# Patient Record
Sex: MALE | Race: WHITE | NOT HISPANIC OR LATINO | Employment: FULL TIME | URBAN - METROPOLITAN AREA
[De-identification: names, ages, dates, MRNs, and addresses within clinical notes are randomized per-mention and may not be internally consistent; named-entity substitution may affect disease eponyms.]

---

## 2018-07-19 ENCOUNTER — HISTORICAL (OUTPATIENT)
Dept: ADMINISTRATIVE | Facility: HOSPITAL | Age: 43
End: 2018-07-19

## 2018-07-19 LAB
ALBUMIN SERPL-MCNC: 4.1 GM/DL (ref 3.4–5)
ALBUMIN/GLOB SERPL: 1 RATIO (ref 1–2)
ALP SERPL-CCNC: 70 UNIT/L (ref 45–117)
ALT SERPL-CCNC: 36 UNIT/L (ref 12–78)
APPEARANCE, UA: CLEAR
AST SERPL-CCNC: 22 UNIT/L (ref 15–37)
BACTERIA #/AREA URNS AUTO: ABNORMAL /[HPF]
BILIRUB SERPL-MCNC: 1.5 MG/DL (ref 0.2–1)
BILIRUB UR QL STRIP: NEGATIVE
BILIRUBIN DIRECT+TOT PNL SERPL-MCNC: 0.3 MG/DL
BILIRUBIN DIRECT+TOT PNL SERPL-MCNC: 1.2 MG/DL
BUN SERPL-MCNC: 10 MG/DL (ref 7–18)
CALCIUM SERPL-MCNC: 9.2 MG/DL (ref 8.5–10.1)
CHLORIDE SERPL-SCNC: 107 MMOL/L (ref 98–107)
CHOLEST SERPL-MCNC: 214 MG/DL
CHOLEST/HDLC SERPL: 5.9 {RATIO} (ref 0–5)
CO2 SERPL-SCNC: 30 MMOL/L (ref 21–32)
COLOR UR: YELLOW
CREAT SERPL-MCNC: 1.1 MG/DL (ref 0.6–1.3)
EST. AVERAGE GLUCOSE BLD GHB EST-MCNC: 108 MG/DL
GLOBULIN SER-MCNC: 3.7 GM/ML (ref 2.3–3.5)
GLUCOSE (UA): NORMAL
GLUCOSE SERPL-MCNC: 88 MG/DL (ref 74–106)
HBA1C MFR BLD: 5.4 % (ref 4.2–6.3)
HDLC SERPL-MCNC: 36 MG/DL
HGB UR QL STRIP: NEGATIVE
HYALINE CASTS #/AREA URNS LPF: ABNORMAL /[LPF]
KETONES UR QL STRIP: NEGATIVE
LDLC SERPL CALC-MCNC: 140 MG/DL (ref 0–130)
LEUKOCYTE ESTERASE UR QL STRIP: NEGATIVE
NITRITE UR QL STRIP: NEGATIVE
PH UR STRIP: 5.5 [PH] (ref 4.5–8)
POTASSIUM SERPL-SCNC: 5 MMOL/L (ref 3.5–5.1)
PROT SERPL-MCNC: 7.8 GM/DL (ref 6.4–8.2)
PROT UR QL STRIP: NEGATIVE
RBC #/AREA URNS AUTO: ABNORMAL /[HPF]
SODIUM SERPL-SCNC: 141 MMOL/L (ref 136–145)
SP GR UR STRIP: 1.02 (ref 1–1.03)
SQUAMOUS #/AREA URNS LPF: ABNORMAL /[LPF]
T4 FREE SERPL-MCNC: 1.07 NG/DL (ref 0.76–1.46)
TRIGL SERPL-MCNC: 190 MG/DL
TSH SERPL-ACNC: 2.64 MIU/L (ref 0.36–3.74)
UROBILINOGEN UR STRIP-ACNC: NORMAL
VLDLC SERPL CALC-MCNC: 38 MG/DL
WBC #/AREA URNS AUTO: ABNORMAL /HPF

## 2018-08-21 ENCOUNTER — HISTORICAL (OUTPATIENT)
Dept: RADIOLOGY | Facility: HOSPITAL | Age: 43
End: 2018-08-21

## 2018-08-27 ENCOUNTER — HISTORICAL (OUTPATIENT)
Dept: RADIOLOGY | Facility: HOSPITAL | Age: 43
End: 2018-08-27

## 2018-09-07 ENCOUNTER — HISTORICAL (OUTPATIENT)
Dept: RADIOLOGY | Facility: HOSPITAL | Age: 43
End: 2018-09-07

## 2018-10-16 ENCOUNTER — HOSPITAL ENCOUNTER (OUTPATIENT)
Dept: RADIOLOGY | Facility: HOSPITAL | Age: 43
Discharge: HOME | End: 2018-10-16
Attending: PHYSICIAN ASSISTANT

## 2018-10-16 ENCOUNTER — OFFICE VISIT (OUTPATIENT)
Dept: ORTHOPEDICS | Facility: CLINIC | Age: 43
End: 2018-10-16

## 2018-10-16 VITALS
WEIGHT: 269.19 LBS | DIASTOLIC BLOOD PRESSURE: 97 MMHG | HEIGHT: 72 IN | HEART RATE: 93 BPM | BODY MASS INDEX: 36.46 KG/M2 | SYSTOLIC BLOOD PRESSURE: 156 MMHG

## 2018-10-16 DIAGNOSIS — M75.102 TEAR OF LEFT ROTATOR CUFF, UNSPECIFIED TEAR EXTENT: ICD-10-CM

## 2018-10-16 DIAGNOSIS — M25.512 LEFT SHOULDER PAIN, UNSPECIFIED CHRONICITY: ICD-10-CM

## 2018-10-16 DIAGNOSIS — M25.512 LEFT SHOULDER PAIN, UNSPECIFIED CHRONICITY: Primary | ICD-10-CM

## 2018-10-16 PROCEDURE — 99204 OFFICE O/P NEW MOD 45 MIN: CPT | Mod: PBBFAC,25 | Performed by: PHYSICIAN ASSISTANT

## 2018-10-16 PROCEDURE — 99203 OFFICE O/P NEW LOW 30 MIN: CPT | Mod: S$PBB,,, | Performed by: PHYSICIAN ASSISTANT

## 2018-10-16 PROCEDURE — 73030 X-RAY EXAM OF SHOULDER: CPT | Mod: TC,LT

## 2018-10-16 PROCEDURE — 73030 X-RAY EXAM OF SHOULDER: CPT | Mod: 26,LT,, | Performed by: RADIOLOGY

## 2018-10-16 PROCEDURE — 99999 PR PBB SHADOW E&M-NEW PATIENT-LVL IV: CPT | Mod: PBBFAC,,, | Performed by: PHYSICIAN ASSISTANT

## 2018-10-16 RX ORDER — CLONIDINE HYDROCHLORIDE 0.1 MG/1
0.1 TABLET ORAL DAILY PRN
COMMUNITY

## 2018-10-16 RX ORDER — ROSUVASTATIN CALCIUM 20 MG/1
20 TABLET, COATED ORAL DAILY
COMMUNITY

## 2018-10-16 RX ORDER — FLUCONAZOLE 50 MG/1
TABLET ORAL DAILY
COMMUNITY
End: 2018-11-08 | Stop reason: CLARIF

## 2018-10-16 RX ORDER — TRAMADOL HYDROCHLORIDE 50 MG/1
50 TABLET ORAL EVERY 6 HOURS PRN
COMMUNITY
End: 2019-03-11

## 2018-10-16 RX ORDER — ACETAMINOPHEN AND CODEINE PHOSPHATE 300; 30 MG/1; MG/1
TABLET ORAL
COMMUNITY
End: 2019-10-24

## 2018-10-16 RX ORDER — HYDROCHLOROTHIAZIDE 25 MG/1
50 TABLET ORAL DAILY
COMMUNITY

## 2018-10-16 RX ORDER — CLINDAMYCIN HYDROCHLORIDE 150 MG/1
150 CAPSULE ORAL EVERY 6 HOURS
COMMUNITY
End: 2018-11-08 | Stop reason: CLARIF

## 2018-10-16 RX ORDER — HYDRALAZINE HYDROCHLORIDE 50 MG/1
50 TABLET, FILM COATED ORAL 2 TIMES DAILY
COMMUNITY

## 2018-10-16 RX ORDER — IBUPROFEN 800 MG/1
800 TABLET ORAL 3 TIMES DAILY
COMMUNITY
End: 2019-10-24

## 2018-10-16 RX ORDER — MELOXICAM 15 MG/1
15 TABLET ORAL DAILY
COMMUNITY
End: 2018-11-08 | Stop reason: CLARIF

## 2018-10-16 RX ORDER — METRONIDAZOLE 500 MG/1
500 TABLET ORAL
COMMUNITY
End: 2018-11-08 | Stop reason: CLARIF

## 2018-10-16 RX ORDER — LISINOPRIL 20 MG/1
40 TABLET ORAL DAILY
COMMUNITY

## 2018-10-16 RX ORDER — DICLOFENAC POTASSIUM 50 MG/1
POWDER, FOR SOLUTION ORAL DAILY
COMMUNITY
End: 2019-10-24

## 2018-10-16 RX ORDER — CARVEDILOL 25 MG/1
25 TABLET ORAL 2 TIMES DAILY WITH MEALS
COMMUNITY
End: 2018-11-08 | Stop reason: CLARIF

## 2018-10-16 RX ORDER — CEPHALEXIN 500 MG/1
500 CAPSULE ORAL EVERY 6 HOURS
COMMUNITY
End: 2018-11-08 | Stop reason: CLARIF

## 2018-10-16 NOTE — PROGRESS NOTES
"Subjective:      Patient ID: Juan Bennett is a 43 y.o. male.    Chief Complaint: No chief complaint on file.    HPI  43 year old male presents with chief complaint of left shoulder pain since June 2018. He is LHD and does not recall trauma. He possibly had a stroke in June. Pain is worse with everything including moving it and getting dressed. He is not sleeping at night due to shoulder pain. He has taken several nsaids, muscle relaxer, and pain medicine with no relief. He went to [facility] and was given a shoulder injection 1 month ago that did not help. He was told that he is not a surgical candidate because "he can't move his arm." He cannot attend PT due to financial reasons. MRI was done last month which showed supraspinatus tear, OA, and tendinitis.   Review of Systems   Constitution: Negative for chills, fever and night sweats.   Cardiovascular: Negative for chest pain.   Respiratory: Negative for cough and shortness of breath.    Hematologic/Lymphatic: Does not bruise/bleed easily.   Skin: Negative for color change.   Gastrointestinal: Negative for heartburn.   Genitourinary: Negative for dysuria.   Neurological: Negative for numbness and paresthesias.   Psychiatric/Behavioral: Negative for altered mental status.   Allergic/Immunologic: Negative for persistent infections.         Objective:            General    Vitals reviewed.  Constitutional: He is oriented to person, place, and time. He appears well-developed and well-nourished.   Cardiovascular: Normal rate.    Neurological: He is alert and oriented to person, place, and time.             Left Shoulder Exam     Range of Motion   Active abduction:  100 abnormal   Forward Flexion:  120 abnormal   External Rotation 0 degrees: abnormal   Internal rotation 0 degrees: abnormal     Tests & Signs   Negrete test: positive  Impingement: positive  Speed's Test: positive    Other   Sensation: normal     Comments:  Positive empty can test.       Muscle " Strength   Left Upper Extremity  Supraspinatus: 3/5/5   Biceps: 2/5/5     Vascular Exam       Left Pulses      Radial:                    2+          X-ray: ordered and reviewed by myself. Bones: No fracture.  No lytic or blastic lesion.  Joints: No evidence for glenohumeral dislocation.  Acromioclavicular joint is unremarkable.  Soft tissues: Unremarkable        Assessment:       Encounter Diagnoses   Name Primary?    Left shoulder pain, unspecified chronicity Yes    Tear of left rotator cuff, unspecified tear extent           Plan:       Discussed treatment options with patient. He is interested in surgical intervention. Will get with management regarding an appt with a shoulder surgeon as we are unable to schedule something for him at this time.

## 2018-10-17 ENCOUNTER — TELEPHONE (OUTPATIENT)
Dept: ORTHOPEDICS | Facility: CLINIC | Age: 43
End: 2018-10-17

## 2018-10-17 NOTE — TELEPHONE ENCOUNTER
Spoke with patient regarding his appointment at sports medicine on 10-23-18 with Dr. Shaji Galloway

## 2018-10-17 NOTE — TELEPHONE ENCOUNTER
----- Message from Petey Washburn sent at 10/17/2018  3:26 PM CDT -----  Contact: self   Pt is requesting a call back regarding appt for left shoulder pain. Pt can be reached at [phone].

## 2018-10-23 ENCOUNTER — OFFICE VISIT (OUTPATIENT)
Dept: SPORTS MEDICINE | Facility: CLINIC | Age: 43
End: 2018-10-23

## 2018-10-23 VITALS — WEIGHT: 269.19 LBS | HEIGHT: 72 IN | BODY MASS INDEX: 36.46 KG/M2

## 2018-10-23 DIAGNOSIS — M25.512 CHRONIC LEFT SHOULDER PAIN: Primary | ICD-10-CM

## 2018-10-23 DIAGNOSIS — M19.019 DJD OF AC (ACROMIOCLAVICULAR) JOINT: ICD-10-CM

## 2018-10-23 DIAGNOSIS — G89.29 CHRONIC LEFT SHOULDER PAIN: Primary | ICD-10-CM

## 2018-10-23 DIAGNOSIS — M75.20 BICEPS TENDINITIS: ICD-10-CM

## 2018-10-23 DIAGNOSIS — M75.100 ROTATOR CUFF TEAR: ICD-10-CM

## 2018-10-23 PROCEDURE — 99212 OFFICE O/P EST SF 10 MIN: CPT | Mod: PBBFAC,PO | Performed by: ORTHOPAEDIC SURGERY

## 2018-10-23 PROCEDURE — 99999 PR PBB SHADOW E&M-EST. PATIENT-LVL II: CPT | Mod: PBBFAC,,, | Performed by: ORTHOPAEDIC SURGERY

## 2018-10-23 PROCEDURE — 99203 OFFICE O/P NEW LOW 30 MIN: CPT | Mod: S$PBB,,, | Performed by: ORTHOPAEDIC SURGERY

## 2018-10-23 NOTE — LETTER
October 25, 2018      Jayna Santillan PA-C  [address]  [place] [address]           [facility]  [address]  [place] [address]  Phone: [phone]          Patient: Juan Bennett   MR Number: [id number]   YOB: 1975   Date of Visit: 10/23/2018       Dear Jayna Santillan:    Thank you for referring Juan Bennett to me for evaluation. Attached you will find relevant portions of my assessment and plan of care.    If you have questions, please do not hesitate to call me. I look forward to following Juan Bennett along with you.    Sincerely,    Shaji Galloway MD    Enclosure  CC:  No Recipients    If you would like to receive this communication electronically, please contact [email or web address] or [phone] to request more information on [employer] access.    For providers and/or their staff who would like to refer a patient to [facility], please contact us through our one-stop-shop provider referral line, [facility], at [phone].    If you feel you have received this communication in error or would no longer like to receive these types of communications, please e-mail [email or web address]

## 2018-10-23 NOTE — LETTER
[place] [address]  [facility] [address]  Phone: [phone] October 23, 2018     Patient: Juan Bennett   YOB: 1975   Date of Visit: 10/23/2018       To Whom It May Concern:    Juan Bennett is a patient of Dr.Scott Galloway.  Please excuse him from missed work today while he attended a doctor's appointment.    If you have any questions or concerns, please don't hesitate to contact my office.    Sincerely,    Shaji Galloway MD

## 2018-10-23 NOTE — PROGRESS NOTES
CC: LEFT shoulder pain     43 y.o. Male with a history of left shoulder pain without DMITRY since June 2018.  Previously tried and failed: injections, sling, ibuprofen, mobic, voltaren tablets, tylenol 4.    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    He reports that the pain and weakness is worse with overhead activity. It also bothers him at night.    Is affecting ADLs.  Pain is 10/10 at it's worst.      History reviewed. No pertinent past medical history.    History reviewed. No pertinent surgical history.    History reviewed. No pertinent family history.      Current Outpatient Medications:     acetaminophen-codeine 300-30mg (TYLENOL #3) 300-30 mg Tab, Take by mouth., Disp: , Rfl:     AMLODIPINE BESYLATE, BULK, MISC, 10 mg by Misc.(Non-Drug; Combo Route) route., Disp: , Rfl:     carvedilol (COREG) 25 MG tablet, Take 25 mg by mouth 2 (two) times daily with meals., Disp: , Rfl:     diclofenac potassium (CAMBIA) 50 mg PwPk, Take by mouth., Disp: , Rfl:     hydrALAZINE (APRESOLINE) 50 MG tablet, Take 50 mg by mouth 3 (three) times daily., Disp: , Rfl:     hydroCHLOROthiazide (HYDRODIURIL) 25 MG tablet, Take 25 mg by mouth once daily., Disp: , Rfl:     lisinopril (PRINIVIL,ZESTRIL) 20 MG tablet, Take 20 mg by mouth once daily., Disp: , Rfl:     rosuvastatin (CRESTOR) 20 MG tablet, Take 20 mg by mouth once daily., Disp: , Rfl:     aspirin (ASPIR-81 ORAL), Take by mouth., Disp: , Rfl:     cephALEXin (KEFLEX) 500 MG capsule, Take 500 mg by mouth every 6 (six) hours., Disp: , Rfl:     clindamycin (CLEOCIN) 150 MG capsule, Take 150 mg by mouth every 6 (six) hours., Disp: , Rfl:     cloNIDine (CATAPRES) 0.1 MG tablet, Take 0.1 mg by mouth 2 (two) times daily., Disp: , Rfl:     FAMOTIDINE ORAL, 20 mg., Disp: , Rfl:     fluconazole (DIFLUCAN) 50 MG Tab, Take by mouth once daily., Disp: , Rfl:     ibuprofen (ADVIL,MOTRIN) 800 MG tablet, Take 800 mg by mouth 3 (three) times daily., Disp: , Rfl:      meloxicam (MOBIC) 15 MG tablet, Take 15 mg by mouth once daily., Disp: , Rfl:     metroNIDAZOLE (FLAGYL) 500 MG tablet, Take 500 mg by mouth every 8 (eight) hours., Disp: , Rfl:     traMADol (ULTRAM) 50 mg tablet, Take 50 mg by mouth every 6 (six) hours as needed for Pain., Disp: , Rfl:     Review of patient's allergies indicates:   Allergen Reactions    Albuterol Hives    Morphine Hives    Pcn [penicillins] Hives    Xopenex [levalbuterol hcl] Hives          REVIEW OF SYSTEMS:  Constitution: Negative. Negative for chills, fever and night sweats.   HENT: Negative for congestion and headaches.    Eyes: Negative for blurred vision, left vision loss and right vision loss.   Cardiovascular: Negative for chest pain and syncope.   Respiratory: Negative for cough and shortness of breath.    Endocrine: Negative for polydipsia, polyphagia and polyuria.   Hematologic/Lymphatic: Negative for bleeding problem. Does not bruise/bleed easily.   Skin: Negative for dry skin, itching and rash.   Musculoskeletal: Negative for falls.  Positive for left shoulder pain and muscle weakness.   Gastrointestinal: Negative for abdominal pain and bowel incontinence.   Genitourinary: Negative for bladder incontinence and nocturia.   Neurological: Negative for disturbances in coordination, loss of balance and seizures.   Psychiatric/Behavioral: Negative for depression. The patient does not have insomnia.    Allergic/Immunologic: Negative for hives and persistent infections.      PHYSICAL EXAMINATION:  Vitals:  Ht 6' (1.829 m)   Wt 122.1 kg (269 lb 2.9 oz)   BMI 36.51 kg/m²    General: The patient is alert and oriented x 3.  Mood is pleasant.  Observation of ears, eyes and nose reveal no gross abnormalities.  No labored breathing observed.  Gait is coordinated. Patient can toe walk and heel walk without difficulty.      LEFT Shoulder / Upper Extremity Exam    OBSERVATION:     Swelling  none  Deformity  none   Discoloration  none   Scapular  winging none   Scars   none  Atrophy  none    TENDERNESS / CREPITUS (T/C):          T/C      T/C   Clavicle   -/-  SUPRAspinatus    -/-     AC Jt.    -/-  INFRAspinatus  -/-    SC Jt.    -/-  Deltoid    -/-      G. Tuberosity  -/-  LH BICEP groove  -/-   Acromion:  -/-  Midline Neck   -/-     Scapular Spine -/-  Trapezium   -/-   SMA Scapula  -/-  GH jt. line - post  -/-     Scapulothoracic  -/-         ROM: (* = with pain)  Right shoulder   Left shoulder        AROM (PROM)   AROM (PROM)   FE    170° (175°)     65° (120°)  *   ER at 90° ABD  90°  (90°)    60°  (75°) *   IR (spine level)   T10     BP *    STRENGTH: (* = with pain) Right shoulder   Left shoulder    SCAPTION   5/5    3+/5    IR    5/5    4/5   ER    5/5    4/5   BICEPS   5/5    4+/5   Deltoid    5/5    5/5     SIGNS:  Painful side       NEER   +    ORANDYS  neg    REYNA   +    SPEEDS  neg     DROP ARM   +   BELLY PRESS neg   Superior escape none    LIFT-OFF  neg   X-Body ADD    neg    MOVING VALGUS neg        STABILITY TESTING    Right shoulder   Left shoulder    Translation     Anterior  up face     up face    Posterior  up face    up face    Sulcus   < 10mm    < 10 mm     Signs   Apprehension   neg      neg       Relocation   no change     no change      Jerk test  neg     neg    EXTREMITY NEURO-VASCULAR EXAM:    Sensation grossly intact to light touch all dermatomal regions.    DTR 2+ Biceps, Triceps, BR and Negative Josiass sign   Grossly intact motor function at Elbow, Wrist and Hand   Distal pulses radial and ulnar 2+, brisk cap refill, symmetric.      NECK:  Painless FROM and spinous processes non-tender. Negative Spurlings sign.      OTHER FINDINGS:  + scapular dyskinesia    XRAYS (10/16/18):  Bones: No fracture.  No lytic or blastic lesion.    Joints: No evidence for glenohumeral dislocation.  Acromioclavicular joint is unremarkable.    Soft tissues: Unremarkable.    MRI External - [facility] in [place], [place]  (9/7/18):  1. Full-thickness tear of the distal anterior to central supraspinatus tendon is seen.  Partial thickness bursal surface tearing of the remainder of the supraspinatus tendon with interstitial tearing and tendinosis is seen.  2. Mild infraspinatus tendinosis is seen.  Partial thickness articular and bursal surface tearing of the conjoint tendon is seen.  3. Split tearing of the proximal extra-articular long head biceps tendon is seen.  4. Moderate AC joint arthritic changes are seen.      ASSESSMENT:   Left shoulder pain, rotator cuff tear, AC DJD, biceps tenodesis    PLAN:    1. Treatment options were discussed with the patient about shoulder.  I reviewed the MRI images with his and what this means for his shoulder.    We discussed both non-operative and operative options for his shoulder and the risks and benefits of each. Time was given for questions to be asked and all concerns were answered.    He would like to go forward with surgery for his shoulder which I think is reasonable.  All specific risks and benefits were reviewed.    These risks include but are not limited to: bleeding, infection, scarring, re-tear of repair, irreparability of the tear, damage to neurovascular structures, damage to cartilage, stiffness, blood clots, pulmonary embolism, swelling, compartment syndrome, need for further surgery, and the risks of anesthesia.      He verbalized her understanding of these risks and wished to proceed with surgery.    Time was spent face-to-face with the patient during this encounter on counseling about treatment options including surgery and coordination of his care for preoperative visits, surgery and post-operative rehab.     The operative plan will be:    left   1. Arthroscopic rotator cuff repair with rotation medical  2. Arthroscopic subacromial decompression  3. Arthroscopic distal clavicle excision  4. Possible open biceps subpectoral tenodesis    Patient will  need medical clearance  prior to the pre-operative appointment.    All questions were answered, patient will contact us for questions or concerns in the interim.

## 2018-10-24 DIAGNOSIS — M75.22 BICEPS TENDINITIS OF LEFT UPPER EXTREMITY: ICD-10-CM

## 2018-10-24 DIAGNOSIS — M19.012 ARTHRITIS OF LEFT ACROMIOCLAVICULAR JOINT: ICD-10-CM

## 2018-10-24 DIAGNOSIS — M75.102 NONTRAUMATIC TEAR OF LEFT ROTATOR CUFF: Primary | ICD-10-CM

## 2018-10-25 ENCOUNTER — TELEPHONE (OUTPATIENT)
Dept: SPORTS MEDICINE | Facility: CLINIC | Age: 43
End: 2018-10-25

## 2018-10-25 DIAGNOSIS — M25.512 LEFT SHOULDER PAIN: Primary | ICD-10-CM

## 2018-10-25 DIAGNOSIS — M75.100 ROTATOR CUFF TEAR: ICD-10-CM

## 2018-10-25 NOTE — TELEPHONE ENCOUNTER
Left shoulder 11.15    ----- Message from Candida Lundy MA sent at 10/24/2018  9:55 AM CDT -----  Patient will do PT at     [facility] [address], [address]   PH: [phone]   Fx: [phone]     Pre op - 11/08/18      Date of surgery - 11/12/18

## 2018-10-26 NOTE — TELEPHONE ENCOUNTER
Patient is going to email me his [place] paperwork.  Instructed him that I cannot email it back to him once it is completed.  But we can fax it to him.  He will include the fax number with the email.  Says he needs it back by 11/2/18.

## 2018-10-26 NOTE — TELEPHONE ENCOUNTER
----- Message from Darell Stratton sent at 10/26/2018  2:27 PM CDT -----  Contact: patient  Attn Graciela  Please call pt at [phone].  Patient stated that the [place] paperwork has to be returned to his employer no later than 11/03/18. What is the status?    Thank you

## 2018-10-30 ENCOUNTER — TELEPHONE (OUTPATIENT)
Dept: SPORTS MEDICINE | Facility: CLINIC | Age: 43
End: 2018-10-30

## 2018-11-07 ENCOUNTER — TELEPHONE (OUTPATIENT)
Dept: SPORTS MEDICINE | Facility: CLINIC | Age: 43
End: 2018-11-07

## 2018-11-07 NOTE — TELEPHONE ENCOUNTER
----- Message from Gisel Oliveros sent at 11/7/2018  9:46 AM CST -----  Contact: Self  Calling to confirm if some document were sent over to Dr. Galloway from the pt's PCP. Pt could be reached at [phone].

## 2018-11-08 ENCOUNTER — OFFICE VISIT (OUTPATIENT)
Dept: SPORTS MEDICINE | Facility: CLINIC | Age: 43
End: 2018-11-08

## 2018-11-08 ENCOUNTER — HOSPITAL ENCOUNTER (OUTPATIENT)
Dept: PREADMISSION TESTING | Facility: OTHER | Age: 43
Discharge: HOME | End: 2018-11-08
Attending: ORTHOPAEDIC SURGERY

## 2018-11-08 ENCOUNTER — ANESTHESIA EVENT (OUTPATIENT)
Dept: SURGERY | Facility: OTHER | Age: 43
End: 2018-11-08

## 2018-11-08 VITALS
HEIGHT: 72 IN | WEIGHT: 269 LBS | SYSTOLIC BLOOD PRESSURE: 128 MMHG | BODY MASS INDEX: 36.44 KG/M2 | DIASTOLIC BLOOD PRESSURE: 64 MMHG

## 2018-11-08 VITALS
OXYGEN SATURATION: 98 % | HEART RATE: 76 BPM | DIASTOLIC BLOOD PRESSURE: 83 MMHG | SYSTOLIC BLOOD PRESSURE: 128 MMHG | HEIGHT: 72 IN | BODY MASS INDEX: 36.44 KG/M2 | TEMPERATURE: 98 F | WEIGHT: 269 LBS

## 2018-11-08 DIAGNOSIS — M75.102 NONTRAUMATIC TEAR OF LEFT ROTATOR CUFF: Primary | ICD-10-CM

## 2018-11-08 DIAGNOSIS — M19.012 ARTHRITIS OF LEFT ACROMIOCLAVICULAR JOINT: ICD-10-CM

## 2018-11-08 DIAGNOSIS — M75.22 BICEPS TENDINITIS OF LEFT UPPER EXTREMITY: ICD-10-CM

## 2018-11-08 PROCEDURE — 99999 PR PBB SHADOW E&M-EST. PATIENT-LVL IV: CPT | Mod: PBBFAC,,, | Performed by: PHYSICIAN ASSISTANT

## 2018-11-08 PROCEDURE — 99499 UNLISTED E&M SERVICE: CPT | Mod: S$PBB,,, | Performed by: PHYSICIAN ASSISTANT

## 2018-11-08 PROCEDURE — 99214 OFFICE O/P EST MOD 30 MIN: CPT | Mod: PBBFAC,PO | Performed by: PHYSICIAN ASSISTANT

## 2018-11-08 RX ORDER — TRAMADOL HYDROCHLORIDE 50 MG/1
50 TABLET ORAL EVERY 6 HOURS PRN
Qty: 40 TABLET | Refills: 0 | Status: SHIPPED | OUTPATIENT
Start: 2018-11-08 | End: 2019-02-07 | Stop reason: SDUPTHER

## 2018-11-08 RX ORDER — LIDOCAINE HYDROCHLORIDE 10 MG/ML
0.5 INJECTION, SOLUTION EPIDURAL; INFILTRATION; INTRACAUDAL; PERINEURAL ONCE
Status: CANCELLED | OUTPATIENT
Start: 2018-11-08 | End: 2018-11-08

## 2018-11-08 RX ORDER — MUPIROCIN 20 MG/G
OINTMENT TOPICAL
Status: CANCELLED | OUTPATIENT
Start: 2018-11-08

## 2018-11-08 RX ORDER — SODIUM CHLORIDE, SODIUM LACTATE, POTASSIUM CHLORIDE, CALCIUM CHLORIDE 600; 310; 30; 20 MG/100ML; MG/100ML; MG/100ML; MG/100ML
INJECTION, SOLUTION INTRAVENOUS CONTINUOUS
Status: CANCELLED | OUTPATIENT
Start: 2018-11-08

## 2018-11-08 RX ORDER — OXYCODONE AND ACETAMINOPHEN 10; 325 MG/1; MG/1
1 TABLET ORAL EVERY 6 HOURS PRN
Qty: 28 TABLET | Refills: 0 | Status: SHIPPED | OUTPATIENT
Start: 2018-11-08 | End: 2018-11-19 | Stop reason: DRUGHIGH

## 2018-11-08 RX ORDER — PROMETHAZINE HYDROCHLORIDE 25 MG/1
25 TABLET ORAL EVERY 6 HOURS PRN
Qty: 40 TABLET | Refills: 0 | Status: SHIPPED | OUTPATIENT
Start: 2018-11-08 | End: 2018-12-10 | Stop reason: SDUPTHER

## 2018-11-08 RX ORDER — ASPIRIN 325 MG
325 TABLET, DELAYED RELEASE (ENTERIC COATED) ORAL 2 TIMES DAILY
Qty: 42 TABLET | Refills: 0 | Status: SHIPPED | OUTPATIENT
Start: 2018-11-08 | End: 2018-12-03

## 2018-11-08 NOTE — DISCHARGE INSTRUCTIONS
PRE-ADMIT TESTING -  [phone]    [address]  [place] [facility]          Your surgery has been scheduled at [facility]. We are pleased to have the opportunity to serve you. For Further Information please call [phone].    On the day of surgery please report to the Information Desk on the 1st floor.    · CONTACT YOUR PHYSICIAN'S OFFICE THE DAY PRIOR TO YOUR SURGERY TO OBTAIN YOUR ARRIVAL TIME.     · The evening before surgery do not eat anything after 9 p.m. ( this includes hard candy, chewing gum and mints).  You may only have GATORADE, POWERADE AND WATER  from 9 p.m. until you leave your home.   DO NOT DRINK ANY LIQUIDS ON THE WAY TO THE HOSPITAL.      SPECIAL MEDICATION INSTRUCTIONS: TAKE medications checked off by the Anesthesiologist on your Medication List.    Angiogram Patients: Take medications as instructed by your physician, including aspirin.     Surgery Patients:    If you take ASPIRIN - Your PHYSICIAN/SURGEON will need to inform you IF/OR when you need to stop taking aspirin prior to your surgery.     Do Not take any medications containing IBUPROFEN.  Do Not Wear any make-up or dark nail polish   (especially eye make-up) to surgery. If you come to surgery with makeup on you will be required to remove the makeup or nail polish.    Do not shave your surgical area at least 5 days prior to your surgery. The surgical prep will be performed at the hospital according to Infection Control regulations.    Leave all valuables at home.   Do Not wear any jewelry or watches, including any metal in body piercings.  Contact Lens must be removed before surgery. Either do not wear the contact lens or bring a case and solution for storage.  Please bring a container for eyeglasses or dentures as required.  Bring any paperwork your physician has provided, such as consent forms,  history and physicals, doctor's orders, etc.   Bring comfortable clothes that are loose fitting to wear upon  discharge. Take into consideration the type of surgery being performed.  Maintain your diet as advised per your physician the day prior to surgery.      Adequate rest the night before surgery is advised.   Park in the Parking lot behind the hospital or in the [facility] across the street from the parking lot. Parking is complimentary.  If you will be discharged the same day as your procedure, please arrange for a responsible adult to drive you home or to accompany you if traveling by taxi.   YOU WILL NOT BE PERMITTED TO DRIVE OR TO LEAVE THE HOSPITAL ALONE AFTER SURGERY.   It is strongly recommended that you arrange for someone to remain with you for the first 24 hrs following your surgery.       Thank you for your cooperation.  The Staff of [facility].        Bathing Instructions                                                                 Please shower the evening before and morning of your procedure with    ANTIBACTERIAL SOAP. ( DIAL, etc )  Concentrate on the surgical area   for at least 3 minutes and rinse completely. Dry off as usual.   Do not use any deodorant, powder, body lotions, perfume, after shave or    cologne.

## 2018-11-08 NOTE — ANESTHESIA PREPROCEDURE EVALUATION
11/08/2018  Juan Bennett is a 43 y.o., male.    Anesthesia Evaluation    I have reviewed the Patient Summary Reports.    I have reviewed the Nursing Notes.   I have reviewed the Medications.     Review of Systems  Anesthesia Hx:  Denies Family Hx of Anesthesia complications.   Denies Personal Hx of Anesthesia complications.   Social:  Smoker    Hematology/Oncology:     Oncology Normal     Cardiovascular:   Exercise tolerance: poor Hypertension hyperlipidemia    Pulmonary:  Pulmonary Normal    Renal/:  Renal/ Normal     Hepatic/GI:  Hepatic/GI Normal    Neurological:   CVA (L sided symptoms), residual symptoms    Endocrine:  Endocrine Normal        Physical Exam  General:  Obesity, Large Beard    Airway/Jaw/Neck:  Airway Findings: Mouth Opening: Normal Tongue: Normal  General Airway Assessment: Adult, Possible difficult intubation  Mallampati: III  TM Distance: Normal, at least 6 cm      Dental:  Dental Findings: In tact        Mental Status:  Mental Status Findings:  Cooperative, Alert and Oriented         Anesthesia Plan  Type of Anesthesia, risks & benefits discussed:  Anesthesia Type:  general  Patient's Preference:   Intra-op Monitoring Plan: standard ASA monitors  Intra-op Monitoring Plan Comments:   Post Op Pain Control Plan: peripheral nerve block and per primary service following discharge from PACU  Post Op Pain Control Plan Comments:   Induction:   IV  Beta Blocker:         Informed Consent: Patient understands risks and agrees with Anesthesia plan.  Questions answered. Anesthesia consent signed with patient.  ASA Score: 3     Day of Surgery Review of History & Physical:    H&P update referred to the surgeon.     Anesthesia Plan Notes: Labs, pre-op eval, ekg on paper chart        Ready For Surgery From Anesthesia Perspective.

## 2018-11-08 NOTE — H&P
"CC:  left shoulder pain     HPI:    PLAN OF ACTION: Juan Bennett  is here for a completion of his perioperative paperwork. he Is scheduled to undergo    left   1. Arthroscopic rotator cuff repair with rotation medical  2. Arthroscopic subacromial decompression  3. Arthroscopic distal clavicle excision  4. Possible open biceps subpectoral tenodesis    on 11/12/18.  He  does need clearance for this procedure which he received from JOSE Jiame with [facility]. She stated, "Juan Bennett is an acceptable candidate for surgery and anesthesia.    Risks, indications and benefits of the surgical procedure were discussed with the patient. All questions with regard to surgery, rehab, expected return to functional activities, activities of daily living and recreational endeavors were answered to his satisfaction.    Review of patient's allergies indicates:   Allergen Reactions    Albuterol Hives    Morphine Hives    Pcn [penicillins] Hives    Xopenex [levalbuterol hcl] Hives       History reviewed. No pertinent past medical history.    History reviewed. No pertinent surgical history.    Social History     Socioeconomic History    Marital status:      Spouse name: Not on file    Number of children: Not on file    Years of education: Not on file    Highest education level: Not on file   Social Needs    Financial resource strain: Not on file    Food insecurity - worry: Not on file    Food insecurity - inability: Not on file    Transportation needs - medical: Not on file    Transportation needs - non-medical: Not on file   Occupational History    Not on file   Tobacco Use    Smoking status: Never Smoker   Substance and Sexual Activity    Alcohol use: Not on file    Drug use: Not on file    Sexual activity: Not on file   Other Topics Concern    Not on file   Social History Narrative    Not on file       History reviewed. No pertinent family history.      Current Outpatient " Medications:     acetaminophen-codeine 300-30mg (TYLENOL #3) 300-30 mg Tab, Take by mouth., Disp: , Rfl:     AMLODIPINE BESYLATE, BULK, MISC, 10 mg by Misc.(Non-Drug; Combo Route) route., Disp: , Rfl:     aspirin (ASPIR-81 ORAL), Take by mouth., Disp: , Rfl:     cloNIDine (CATAPRES) 0.1 MG tablet, Take 0.1 mg by mouth 2 (two) times daily., Disp: , Rfl:     diclofenac potassium (CAMBIA) 50 mg PwPk, Take by mouth., Disp: , Rfl:     FAMOTIDINE ORAL, 20 mg., Disp: , Rfl:     hydrALAZINE (APRESOLINE) 50 MG tablet, Take 50 mg by mouth 3 (three) times daily., Disp: , Rfl:     hydroCHLOROthiazide (HYDRODIURIL) 25 MG tablet, Take 25 mg by mouth once daily., Disp: , Rfl:     lisinopril (PRINIVIL,ZESTRIL) 20 MG tablet, Take 20 mg by mouth once daily., Disp: , Rfl:     rosuvastatin (CRESTOR) 20 MG tablet, Take 20 mg by mouth once daily., Disp: , Rfl:     carvedilol (COREG) 25 MG tablet, Take 25 mg by mouth 2 (two) times daily with meals., Disp: , Rfl:     cephALEXin (KEFLEX) 500 MG capsule, Take 500 mg by mouth every 6 (six) hours., Disp: , Rfl:     clindamycin (CLEOCIN) 150 MG capsule, Take 150 mg by mouth every 6 (six) hours., Disp: , Rfl:     fluconazole (DIFLUCAN) 50 MG Tab, Take by mouth once daily., Disp: , Rfl:     ibuprofen (ADVIL,MOTRIN) 800 MG tablet, Take 800 mg by mouth 3 (three) times daily., Disp: , Rfl:     meloxicam (MOBIC) 15 MG tablet, Take 15 mg by mouth once daily., Disp: , Rfl:     metroNIDAZOLE (FLAGYL) 500 MG tablet, Take 500 mg by mouth every 8 (eight) hours., Disp: , Rfl:     traMADol (ULTRAM) 50 mg tablet, Take 50 mg by mouth every 6 (six) hours as needed for Pain., Disp: , Rfl:     Please see patient's chart for applicable emotional/behavioral/social status.    REVIEW OF SYSTEMS:  Constitution: Negative. Negative for chills, fever and night sweats.   HENT: Negative for congestion and headaches.    Eyes: Negative for blurred vision, left vision loss and right vision loss.    Cardiovascular: Negative for chest pain and syncope.   Respiratory: Negative for cough and shortness of breath.    Endocrine: Negative for polydipsia, polyphagia and polyuria.   Hematologic/Lymphatic: Negative for bleeding problem. Does not bruise/bleed easily.   Skin: Negative for dry skin, itching and rash.   Musculoskeletal: Negative for falls. Positive for left shoulder pain and muscle weakness.   Gastrointestinal: Negative for abdominal pain and bowel incontinence.   Genitourinary: Negative for bladder incontinence and nocturia.   Neurological: Negative for disturbances in coordination, loss of balance and seizures.   Psychiatric/Behavioral: Negative for depression. The patient does not have insomnia.    Allergic/Immunologic: Negative for hives and persistent infections.     Once no other questions were asked, a brief history and physical exam was then performed.    PHYSICAL EXAM:  GEN: A&Ox3, WD WN NAD  HEENT: WNL  CHEST: CTAB, no W/R/R  HEART: RRR, no M/R/G  ABD: Soft, NT ND, BS x4 QUADS  MS; See Epic  NEURO: CN II-XII intact       The surgical consent was then reviewed with the patient, who agreed with all the contents of the consent form and it was signed. he was then given the [facility] surgery packet to bring with him to [facility] for the anesthesia portion of his perioperative paperwork.     PHYSICAL THERAPY:  He was also instructed regarding physical therapy and will begin on  POD#3. He was given a copy of the original prescription to schedule. Another copy of this prescription was also faxed to [facility].    POST OP CARE:instructions were reviewed including care of the wound and dressing after surgery and when he can shower.     PAIN MANAGEMENT: Juan Bennett was also given his pain management regime, which includes the TENS unit given to him by Jono Husain along with the education required for its use. He was also instructed regarding the Polar ice unit that will be in place after surgery and  his postoperative pain medications.     MEDICATION:  Percocet 10/325mg 1 po q 4-6 hours prn pain  Ultram 50 mg one p.o. q.4-6 hours p.r.n. breakthrough pain,   Phenergan 25 mg one p.o. q.4-6 hours p.r.n. nausea and vomiting.  Colace 100mg BID PRN constipation  EC ASA 325mg BID x 3 weeks  Prilosec OTC    Patient was educated on the signs and symptoms of DVTs as well as the risk of their occurrence.     IMPRESSION: surgical candidate for    left   1. Arthroscopic rotator cuff repair with rotation medical  2. Arthroscopic subacromial decompression  3. Arthroscopic distal clavicle excision  4. Possible open biceps subpectoral tenodesis    CONCLUSION: Pre-operative information reviewed with patient and they have voiced their understanding and signed consent. Proceed with surgery as planned.    Dr. Galloway was present in the office at the time of pre op appointment and available for questions if the patient had any for him. As there were no other questions to be asked, he was given my business card along with Shaji Galloway MD business card if he has any questions or concerns prior to surgery or in the postop period.

## 2018-11-08 NOTE — H&P (VIEW-ONLY)
"CC:  left shoulder pain     HPI:    PLAN OF ACTION: Juan Bennett  is here for a completion of his perioperative paperwork. he Is scheduled to undergo    left   1. Arthroscopic rotator cuff repair with rotation medical  2. Arthroscopic subacromial decompression  3. Arthroscopic distal clavicle excision  4. Possible open biceps subpectoral tenodesis    on 11/12/18.  He  does need clearance for this procedure which he received from JOSE Jaime with [facility]. She stated, "Juan Bennett is an acceptable candidate for surgery and anesthesia.    Risks, indications and benefits of the surgical procedure were discussed with the patient. All questions with regard to surgery, rehab, expected return to functional activities, activities of daily living and recreational endeavors were answered to his satisfaction.    Review of patient's allergies indicates:   Allergen Reactions    Albuterol Hives    Morphine Hives    Pcn [penicillins] Hives    Xopenex [levalbuterol hcl] Hives       History reviewed. No pertinent past medical history.    History reviewed. No pertinent surgical history.    Social History     Socioeconomic History    Marital status:      Spouse name: Not on file    Number of children: Not on file    Years of education: Not on file    Highest education level: Not on file   Social Needs    Financial resource strain: Not on file    Food insecurity - worry: Not on file    Food insecurity - inability: Not on file    Transportation needs - medical: Not on file    Transportation needs - non-medical: Not on file   Occupational History    Not on file   Tobacco Use    Smoking status: Never Smoker   Substance and Sexual Activity    Alcohol use: Not on file    Drug use: Not on file    Sexual activity: Not on file   Other Topics Concern    Not on file   Social History Narrative    Not on file       History reviewed. No pertinent family history.      Current Outpatient " Medications:     acetaminophen-codeine 300-30mg (TYLENOL #3) 300-30 mg Tab, Take by mouth., Disp: , Rfl:     AMLODIPINE BESYLATE, BULK, MISC, 10 mg by Misc.(Non-Drug; Combo Route) route., Disp: , Rfl:     aspirin (ASPIR-81 ORAL), Take by mouth., Disp: , Rfl:     cloNIDine (CATAPRES) 0.1 MG tablet, Take 0.1 mg by mouth 2 (two) times daily., Disp: , Rfl:     diclofenac potassium (CAMBIA) 50 mg PwPk, Take by mouth., Disp: , Rfl:     FAMOTIDINE ORAL, 20 mg., Disp: , Rfl:     hydrALAZINE (APRESOLINE) 50 MG tablet, Take 50 mg by mouth 3 (three) times daily., Disp: , Rfl:     hydroCHLOROthiazide (HYDRODIURIL) 25 MG tablet, Take 25 mg by mouth once daily., Disp: , Rfl:     lisinopril (PRINIVIL,ZESTRIL) 20 MG tablet, Take 20 mg by mouth once daily., Disp: , Rfl:     rosuvastatin (CRESTOR) 20 MG tablet, Take 20 mg by mouth once daily., Disp: , Rfl:     carvedilol (COREG) 25 MG tablet, Take 25 mg by mouth 2 (two) times daily with meals., Disp: , Rfl:     cephALEXin (KEFLEX) 500 MG capsule, Take 500 mg by mouth every 6 (six) hours., Disp: , Rfl:     clindamycin (CLEOCIN) 150 MG capsule, Take 150 mg by mouth every 6 (six) hours., Disp: , Rfl:     fluconazole (DIFLUCAN) 50 MG Tab, Take by mouth once daily., Disp: , Rfl:     ibuprofen (ADVIL,MOTRIN) 800 MG tablet, Take 800 mg by mouth 3 (three) times daily., Disp: , Rfl:     meloxicam (MOBIC) 15 MG tablet, Take 15 mg by mouth once daily., Disp: , Rfl:     metroNIDAZOLE (FLAGYL) 500 MG tablet, Take 500 mg by mouth every 8 (eight) hours., Disp: , Rfl:     traMADol (ULTRAM) 50 mg tablet, Take 50 mg by mouth every 6 (six) hours as needed for Pain., Disp: , Rfl:     Please see patient's chart for applicable emotional/behavioral/social status.    REVIEW OF SYSTEMS:  Constitution: Negative. Negative for chills, fever and night sweats.   HENT: Negative for congestion and headaches.    Eyes: Negative for blurred vision, left vision loss and right vision loss.    Cardiovascular: Negative for chest pain and syncope.   Respiratory: Negative for cough and shortness of breath.    Endocrine: Negative for polydipsia, polyphagia and polyuria.   Hematologic/Lymphatic: Negative for bleeding problem. Does not bruise/bleed easily.   Skin: Negative for dry skin, itching and rash.   Musculoskeletal: Negative for falls. Positive for left shoulder pain and muscle weakness.   Gastrointestinal: Negative for abdominal pain and bowel incontinence.   Genitourinary: Negative for bladder incontinence and nocturia.   Neurological: Negative for disturbances in coordination, loss of balance and seizures.   Psychiatric/Behavioral: Negative for depression. The patient does not have insomnia.    Allergic/Immunologic: Negative for hives and persistent infections.     Once no other questions were asked, a brief history and physical exam was then performed.    PHYSICAL EXAM:  GEN: A&Ox3, WD WN NAD  HEENT: WNL  CHEST: CTAB, no W/R/R  HEART: RRR, no M/R/G  ABD: Soft, NT ND, BS x4 QUADS  MS; See Epic  NEURO: CN II-XII intact       The surgical consent was then reviewed with the patient, who agreed with all the contents of the consent form and it was signed. he was then given the [facility] surgery packet to bring with him to [facility] for the anesthesia portion of his perioperative paperwork.     PHYSICAL THERAPY:  He was also instructed regarding physical therapy and will begin on  POD#3. He was given a copy of the original prescription to schedule. Another copy of this prescription was also faxed to [facility].    POST OP CARE:instructions were reviewed including care of the wound and dressing after surgery and when he can shower.     PAIN MANAGEMENT: Juan Bennett was also given his pain management regime, which includes the TENS unit given to him by Jono Husain along with the education required for its use. He was also instructed regarding the Polar ice unit that will be in place after surgery and  his postoperative pain medications.     MEDICATION:  Percocet 10/325mg 1 po q 4-6 hours prn pain  Ultram 50 mg one p.o. q.4-6 hours p.r.n. breakthrough pain,   Phenergan 25 mg one p.o. q.4-6 hours p.r.n. nausea and vomiting.  Colace 100mg BID PRN constipation  EC ASA 325mg BID x 3 weeks  Prilosec OTC    Patient was educated on the signs and symptoms of DVTs as well as the risk of their occurrence.     IMPRESSION: surgical candidate for    left   1. Arthroscopic rotator cuff repair with rotation medical  2. Arthroscopic subacromial decompression  3. Arthroscopic distal clavicle excision  4. Possible open biceps subpectoral tenodesis    CONCLUSION: Pre-operative information reviewed with patient and they have voiced their understanding and signed consent. Proceed with surgery as planned.    Dr. Galloway was present in the office at the time of pre op appointment and available for questions if the patient had any for him. As there were no other questions to be asked, he was given my business card along with Shaji Galloway MD business card if he has any questions or concerns prior to surgery or in the postop period.

## 2018-11-12 ENCOUNTER — ANESTHESIA (OUTPATIENT)
Dept: SURGERY | Facility: OTHER | Age: 43
End: 2018-11-12

## 2018-11-12 ENCOUNTER — HOSPITAL ENCOUNTER (OUTPATIENT)
Facility: OTHER | Age: 43
Discharge: HOME | End: 2018-11-13
Attending: ORTHOPAEDIC SURGERY | Admitting: ORTHOPAEDIC SURGERY

## 2018-11-12 DIAGNOSIS — M75.102 NONTRAUMATIC TEAR OF LEFT ROTATOR CUFF: ICD-10-CM

## 2018-11-12 DIAGNOSIS — M19.012 ARTHRITIS OF LEFT ACROMIOCLAVICULAR JOINT: ICD-10-CM

## 2018-11-12 DIAGNOSIS — M75.22 BICEPS TENDINITIS OF LEFT UPPER EXTREMITY: ICD-10-CM

## 2018-11-12 PROCEDURE — 63600175 PHARM REV CODE 636 W HCPCS: Performed by: ANESTHESIOLOGY

## 2018-11-12 PROCEDURE — 25000003 PHARM REV CODE 250: Performed by: SPECIALIST

## 2018-11-12 PROCEDURE — 63600175 PHARM REV CODE 636 W HCPCS: Performed by: PHYSICIAN ASSISTANT

## 2018-11-12 PROCEDURE — 36000710: Performed by: ORTHOPAEDIC SURGERY

## 2018-11-12 PROCEDURE — 63600175 PHARM REV CODE 636 W HCPCS: Performed by: NURSE ANESTHETIST, CERTIFIED REGISTERED

## 2018-11-12 PROCEDURE — 71000015 HC POSTOP RECOV 1ST HR: Performed by: ORTHOPAEDIC SURGERY

## 2018-11-12 PROCEDURE — 63600175 PHARM REV CODE 636 W HCPCS: Performed by: SPECIALIST

## 2018-11-12 PROCEDURE — C1889 IMPLANT/INSERT DEVICE, NOC: HCPCS | Performed by: ORTHOPAEDIC SURGERY

## 2018-11-12 PROCEDURE — 25000003 PHARM REV CODE 250: Performed by: PHYSICIAN ASSISTANT

## 2018-11-12 PROCEDURE — 71000016 HC POSTOP RECOV ADDL HR: Performed by: ORTHOPAEDIC SURGERY

## 2018-11-12 PROCEDURE — 37000008 HC ANESTHESIA 1ST 15 MINUTES: Performed by: ORTHOPAEDIC SURGERY

## 2018-11-12 PROCEDURE — 25000003 PHARM REV CODE 250: Performed by: ANESTHESIOLOGY

## 2018-11-12 PROCEDURE — 27201423 OPTIME MED/SURG SUP & DEVICES STERILE SUPPLY: Performed by: ORTHOPAEDIC SURGERY

## 2018-11-12 PROCEDURE — 71000039 HC RECOVERY, EACH ADD'L HOUR: Performed by: ORTHOPAEDIC SURGERY

## 2018-11-12 PROCEDURE — 63600175 PHARM REV CODE 636 W HCPCS: Performed by: ORTHOPAEDIC SURGERY

## 2018-11-12 PROCEDURE — 36000711: Performed by: ORTHOPAEDIC SURGERY

## 2018-11-12 PROCEDURE — 29824 SHO ARTHRS SRG DSTL CLAVICLC: CPT | Mod: 51,RT,, | Performed by: ORTHOPAEDIC SURGERY

## 2018-11-12 PROCEDURE — C1713 ANCHOR/SCREW BN/BN,TIS/BN: HCPCS | Performed by: ORTHOPAEDIC SURGERY

## 2018-11-12 PROCEDURE — 37000009 HC ANESTHESIA EA ADD 15 MINS: Performed by: ORTHOPAEDIC SURGERY

## 2018-11-12 PROCEDURE — 25000003 PHARM REV CODE 250: Performed by: NURSE ANESTHETIST, CERTIFIED REGISTERED

## 2018-11-12 PROCEDURE — 29827 SHO ARTHRS SRG RT8TR CUF RPR: CPT | Mod: RT,,, | Performed by: ORTHOPAEDIC SURGERY

## 2018-11-12 PROCEDURE — 29826 SHO ARTHRS SRG DECOMPRESSION: CPT | Mod: RT,,, | Performed by: ORTHOPAEDIC SURGERY

## 2018-11-12 PROCEDURE — 71000033 HC RECOVERY, INTIAL HOUR: Performed by: ORTHOPAEDIC SURGERY

## 2018-11-12 DEVICE — ANCHOR BONE ARTHSCP DEL SYS: Type: IMPLANTABLE DEVICE | Site: SHOULDER | Status: FUNCTIONAL

## 2018-11-12 DEVICE — ANCHOR HEALIX 5.5 ADV BR3: Type: IMPLANTABLE DEVICE | Site: SHOULDER | Status: FUNCTIONAL

## 2018-11-12 DEVICE — IMPLANT ARTHSCP BIOINDUCTV LG: Type: IMPLANTABLE DEVICE | Site: SHOULDER | Status: FUNCTIONAL

## 2018-11-12 DEVICE — ANCHOR TENDON 8: Type: IMPLANTABLE DEVICE | Site: SHOULDER | Status: FUNCTIONAL

## 2018-11-12 RX ORDER — OXYCODONE AND ACETAMINOPHEN 10; 325 MG/1; MG/1
1 TABLET ORAL EVERY 6 HOURS PRN
Status: DISCONTINUED | OUTPATIENT
Start: 2018-11-12 | End: 2018-11-13

## 2018-11-12 RX ORDER — PROPOFOL 10 MG/ML
VIAL (ML) INTRAVENOUS
Status: DISCONTINUED | OUTPATIENT
Start: 2018-11-12 | End: 2018-11-12

## 2018-11-12 RX ORDER — TRAMADOL HYDROCHLORIDE 50 MG/1
50 TABLET ORAL ONCE
Status: COMPLETED | OUTPATIENT
Start: 2018-11-12 | End: 2018-11-12

## 2018-11-12 RX ORDER — ROCURONIUM BROMIDE 10 MG/ML
INJECTION, SOLUTION INTRAVENOUS
Status: DISCONTINUED | OUTPATIENT
Start: 2018-11-12 | End: 2018-11-12

## 2018-11-12 RX ORDER — ACETAMINOPHEN 10 MG/ML
INJECTION, SOLUTION INTRAVENOUS
Status: DISCONTINUED | OUTPATIENT
Start: 2018-11-12 | End: 2018-11-12

## 2018-11-12 RX ORDER — OXYCODONE HYDROCHLORIDE 5 MG/1
5 TABLET ORAL
Status: DISCONTINUED | OUTPATIENT
Start: 2018-11-12 | End: 2018-11-12

## 2018-11-12 RX ORDER — AMLODIPINE BESYLATE 5 MG/1
10 TABLET ORAL DAILY
Status: DISCONTINUED | OUTPATIENT
Start: 2018-11-12 | End: 2018-11-13 | Stop reason: HOSPADM

## 2018-11-12 RX ORDER — MIDAZOLAM HYDROCHLORIDE 1 MG/ML
2 INJECTION INTRAMUSCULAR; INTRAVENOUS
Status: COMPLETED | OUTPATIENT
Start: 2018-11-12 | End: 2018-11-12

## 2018-11-12 RX ORDER — MUPIROCIN 20 MG/G
1 OINTMENT TOPICAL 2 TIMES DAILY
Status: DISCONTINUED | OUTPATIENT
Start: 2018-11-12 | End: 2018-11-13 | Stop reason: HOSPADM

## 2018-11-12 RX ORDER — CLONIDINE HYDROCHLORIDE 0.1 MG/1
0.1 TABLET ORAL DAILY PRN
Status: DISCONTINUED | OUTPATIENT
Start: 2018-11-12 | End: 2018-11-12

## 2018-11-12 RX ORDER — NEOSTIGMINE METHYLSULFATE 1 MG/ML
INJECTION, SOLUTION INTRAVENOUS
Status: DISCONTINUED | OUTPATIENT
Start: 2018-11-12 | End: 2018-11-12

## 2018-11-12 RX ORDER — MEPERIDINE HYDROCHLORIDE 50 MG/ML
12.5 INJECTION INTRAMUSCULAR; INTRAVENOUS; SUBCUTANEOUS ONCE AS NEEDED
Status: DISCONTINUED | OUTPATIENT
Start: 2018-11-12 | End: 2018-11-12

## 2018-11-12 RX ORDER — ONDANSETRON 2 MG/ML
INJECTION INTRAMUSCULAR; INTRAVENOUS
Status: DISCONTINUED | OUTPATIENT
Start: 2018-11-12 | End: 2018-11-12

## 2018-11-12 RX ORDER — SODIUM CHLORIDE, SODIUM LACTATE, POTASSIUM CHLORIDE, CALCIUM CHLORIDE 600; 310; 30; 20 MG/100ML; MG/100ML; MG/100ML; MG/100ML
INJECTION, SOLUTION INTRAVENOUS CONTINUOUS
Status: DISCONTINUED | OUTPATIENT
Start: 2018-11-12 | End: 2018-11-12

## 2018-11-12 RX ORDER — SODIUM CHLORIDE 0.9 % (FLUSH) 0.9 %
3 SYRINGE (ML) INJECTION
Status: DISCONTINUED | OUTPATIENT
Start: 2018-11-12 | End: 2018-11-12

## 2018-11-12 RX ORDER — EPINEPHRINE 1 MG/ML
INJECTION, SOLUTION INTRACARDIAC; INTRAMUSCULAR; INTRAVENOUS; SUBCUTANEOUS
Status: DISCONTINUED | OUTPATIENT
Start: 2018-11-12 | End: 2018-11-12 | Stop reason: HOSPADM

## 2018-11-12 RX ORDER — ROSUVASTATIN CALCIUM 10 MG/1
20 TABLET, COATED ORAL DAILY
Status: DISCONTINUED | OUTPATIENT
Start: 2018-11-12 | End: 2018-11-13 | Stop reason: HOSPADM

## 2018-11-12 RX ORDER — MUPIROCIN 20 MG/G
OINTMENT TOPICAL
Status: DISCONTINUED | OUTPATIENT
Start: 2018-11-12 | End: 2018-11-12

## 2018-11-12 RX ORDER — FENTANYL CITRATE 50 UG/ML
25 INJECTION, SOLUTION INTRAMUSCULAR; INTRAVENOUS EVERY 5 MIN PRN
Status: DISCONTINUED | OUTPATIENT
Start: 2018-11-12 | End: 2018-11-12

## 2018-11-12 RX ORDER — LIDOCAINE HYDROCHLORIDE 10 MG/ML
0.5 INJECTION, SOLUTION EPIDURAL; INFILTRATION; INTRACAUDAL; PERINEURAL ONCE
Status: DISCONTINUED | OUTPATIENT
Start: 2018-11-12 | End: 2018-11-12

## 2018-11-12 RX ORDER — HYDROMORPHONE HYDROCHLORIDE 2 MG/ML
0.4 INJECTION, SOLUTION INTRAMUSCULAR; INTRAVENOUS; SUBCUTANEOUS EVERY 5 MIN PRN
Status: DISCONTINUED | OUTPATIENT
Start: 2018-11-12 | End: 2018-11-12

## 2018-11-12 RX ORDER — LIDOCAINE HCL/PF 100 MG/5ML
SYRINGE (ML) INTRAVENOUS
Status: DISCONTINUED | OUTPATIENT
Start: 2018-11-12 | End: 2018-11-12

## 2018-11-12 RX ORDER — GLYCOPYRROLATE 0.2 MG/ML
INJECTION INTRAMUSCULAR; INTRAVENOUS
Status: DISCONTINUED | OUTPATIENT
Start: 2018-11-12 | End: 2018-11-12

## 2018-11-12 RX ORDER — HYDRALAZINE HYDROCHLORIDE 25 MG/1
50 TABLET, FILM COATED ORAL 2 TIMES DAILY
Status: DISCONTINUED | OUTPATIENT
Start: 2018-11-12 | End: 2018-11-13 | Stop reason: HOSPADM

## 2018-11-12 RX ORDER — FENTANYL CITRATE 50 UG/ML
100 INJECTION, SOLUTION INTRAMUSCULAR; INTRAVENOUS EVERY 5 MIN PRN
Status: COMPLETED | OUTPATIENT
Start: 2018-11-12 | End: 2018-11-12

## 2018-11-12 RX ORDER — SODIUM CHLORIDE 0.9 % (FLUSH) 0.9 %
5 SYRINGE (ML) INJECTION
Status: DISCONTINUED | OUTPATIENT
Start: 2018-11-12 | End: 2018-11-13 | Stop reason: HOSPADM

## 2018-11-12 RX ORDER — PROMETHAZINE HYDROCHLORIDE 25 MG/1
25 TABLET ORAL EVERY 6 HOURS PRN
Status: DISCONTINUED | OUTPATIENT
Start: 2018-11-12 | End: 2018-11-13 | Stop reason: HOSPADM

## 2018-11-12 RX ORDER — ASPIRIN 325 MG
325 TABLET, DELAYED RELEASE (ENTERIC COATED) ORAL 2 TIMES DAILY
Status: DISCONTINUED | OUTPATIENT
Start: 2018-11-13 | End: 2018-11-13 | Stop reason: HOSPADM

## 2018-11-12 RX ORDER — HYDROCHLOROTHIAZIDE 25 MG/1
25 TABLET ORAL DAILY
Status: DISCONTINUED | OUTPATIENT
Start: 2018-11-12 | End: 2018-11-13 | Stop reason: HOSPADM

## 2018-11-12 RX ORDER — ROPIVACAINE HYDROCHLORIDE 5 MG/ML
INJECTION, SOLUTION EPIDURAL; INFILTRATION; PERINEURAL
Status: COMPLETED | OUTPATIENT
Start: 2018-11-12 | End: 2018-11-12

## 2018-11-12 RX ORDER — LISINOPRIL 20 MG/1
20 TABLET ORAL DAILY
Status: DISCONTINUED | OUTPATIENT
Start: 2018-11-12 | End: 2018-11-13 | Stop reason: HOSPADM

## 2018-11-12 RX ORDER — CYCLOBENZAPRINE HCL 10 MG
10 TABLET ORAL ONCE
Status: COMPLETED | OUTPATIENT
Start: 2018-11-12 | End: 2018-11-12

## 2018-11-12 RX ORDER — ONDANSETRON 2 MG/ML
4 INJECTION INTRAMUSCULAR; INTRAVENOUS EVERY 12 HOURS PRN
Status: DISCONTINUED | OUTPATIENT
Start: 2018-11-12 | End: 2018-11-13 | Stop reason: HOSPADM

## 2018-11-12 RX ORDER — CLONIDINE HYDROCHLORIDE 0.1 MG/1
0.1 TABLET ORAL DAILY PRN
Status: DISCONTINUED | OUTPATIENT
Start: 2018-11-12 | End: 2018-11-13 | Stop reason: HOSPADM

## 2018-11-12 RX ORDER — ONDANSETRON 2 MG/ML
4 INJECTION INTRAMUSCULAR; INTRAVENOUS DAILY PRN
Status: COMPLETED | OUTPATIENT
Start: 2018-11-12 | End: 2018-11-12

## 2018-11-12 RX ADMIN — LIDOCAINE HYDROCHLORIDE 75 MG: 20 INJECTION, SOLUTION INTRAVENOUS at 08:11

## 2018-11-12 RX ADMIN — FENTANYL CITRATE 50 MCG: 50 INJECTION, SOLUTION INTRAMUSCULAR; INTRAVENOUS at 08:11

## 2018-11-12 RX ADMIN — GLYCOPYRROLATE 0.8 MG: 0.2 INJECTION, SOLUTION INTRAMUSCULAR; INTRAVENOUS at 11:11

## 2018-11-12 RX ADMIN — MUPIROCIN: 20 OINTMENT TOPICAL at 06:11

## 2018-11-12 RX ADMIN — CYCLOBENZAPRINE HYDROCHLORIDE 10 MG: 10 TABLET, FILM COATED ORAL at 03:11

## 2018-11-12 RX ADMIN — PHENYLEPHRINE HYDROCHLORIDE 10 MCG/MIN: 10 INJECTION INTRAVENOUS at 08:11

## 2018-11-12 RX ADMIN — ROPIVACAINE HYDROCHLORIDE 25 ML: 5 INJECTION, SOLUTION EPIDURAL; INFILTRATION; PERINEURAL at 07:11

## 2018-11-12 RX ADMIN — ACETAMINOPHEN 1000 MG: 10 INJECTION, SOLUTION INTRAVENOUS at 11:11

## 2018-11-12 RX ADMIN — OXYCODONE HYDROCHLORIDE AND ACETAMINOPHEN 1 TABLET: 10; 325 TABLET ORAL at 06:11

## 2018-11-12 RX ADMIN — FENTANYL CITRATE 100 MCG: 50 INJECTION, SOLUTION INTRAMUSCULAR; INTRAVENOUS at 06:11

## 2018-11-12 RX ADMIN — ONDANSETRON HYDROCHLORIDE 4 MG: 2 INJECTION INTRAMUSCULAR; INTRAVENOUS at 12:11

## 2018-11-12 RX ADMIN — NEOSTIGMINE METHYLSULFATE 5 MG: 1 INJECTION INTRAVENOUS at 11:11

## 2018-11-12 RX ADMIN — PROPOFOL 200 MG: 10 INJECTION, EMULSION INTRAVENOUS at 08:11

## 2018-11-12 RX ADMIN — ONDANSETRON 4 MG: 2 INJECTION INTRAMUSCULAR; INTRAVENOUS at 10:11

## 2018-11-12 RX ADMIN — OXYCODONE HYDROCHLORIDE 5 MG: 5 TABLET ORAL at 12:11

## 2018-11-12 RX ADMIN — SODIUM CHLORIDE, SODIUM LACTATE, POTASSIUM CHLORIDE, AND CALCIUM CHLORIDE: .6; .31; .03; .02 INJECTION, SOLUTION INTRAVENOUS at 06:11

## 2018-11-12 RX ADMIN — GLYCOPYRROLATE 0.2 MG: 0.2 INJECTION, SOLUTION INTRAMUSCULAR; INTRAVENOUS at 08:11

## 2018-11-12 RX ADMIN — TRAMADOL HYDROCHLORIDE 50 MG: 50 TABLET, FILM COATED ORAL at 03:11

## 2018-11-12 RX ADMIN — MIDAZOLAM HYDROCHLORIDE 2 MG: 1 INJECTION, SOLUTION INTRAMUSCULAR; INTRAVENOUS at 06:11

## 2018-11-12 RX ADMIN — ROCURONIUM BROMIDE 50 MG: 10 INJECTION, SOLUTION INTRAVENOUS at 08:11

## 2018-11-12 RX ADMIN — VANCOMYCIN HYDROCHLORIDE 1500 MG: 1 INJECTION, POWDER, LYOPHILIZED, FOR SOLUTION INTRAVENOUS at 06:11

## 2018-11-12 NOTE — BRIEF OP NOTE
[facility]  Brief Operative Note     SUMMARY     Surgery Date: 11/12/2018     Surgeon(s) and Role:     * Shaji Galloway MD - Primary    Assistants:  Tate Luo MD - Fellow  BELLA Bond    Pre-op Diagnosis:  Nontraumatic tear of left rotator cuff [M75.102]  Biceps tendinitis of left upper extremity [M75.22]  Arthritis of left acromioclavicular joint [M19.012]    Post-op Diagnosis:  Post-Op Diagnosis Codes:     * Nontraumatic tear of left rotator cuff [M75.102]     * Biceps tendinitis of left upper extremity [M75.22]     * Arthritis of left acromioclavicular joint [M19.012]    Procedure(s) (LRB):  REPAIR, ROTATOR CUFF, ARTHROSCOPIC (Left)  FIXATION, TENDON (Left)  ARTHROSCOPY, SHOULDER, WITH SUBACROMIAL SPACE DECOMPRESSION (Left)    Anesthesia: General    Description of the findings of the procedure: biceps tendon and GH cartilage looked good. Full thickness supra tear identified and repaired w/ free convergence sutures and triple loaded 5.5 anchor. SAD and DCE completed. Rotation medical scaffold applied over tear.    Findings/Key Components: see above    Estimated Blood Loss: <10mL    Specimens:   Specimen (12h ago, onward)    None          Discharge Note    SUMMARY     Admit Date: 11/12/2018    Discharge Date and Time:  11/12/2018 10:40 AM    Hospital Course (synopsis of major diagnoses, care, treatment, and services provided during the course of the hospital stay): to PACU postop. D/C from PACU once criteria met.      Final Diagnosis: Post-Op Diagnosis Codes:     * Nontraumatic tear of left rotator cuff [M75.102]     * Biceps tendinitis of left upper extremity [M75.22]     * Arthritis of left acromioclavicular joint [M19.012]    Disposition: Home or Self Care    Follow Up/Patient Instructions:     Medications:  Reconciled Home Medications:      Medication List      CONTINUE taking these medications    acetaminophen-codeine 300-30mg 300-30 mg Tab  Commonly known as:  TYLENOL  #3  Take by mouth.     AMLODIPINE BESYLATE (BULK) MISC  10 mg by Misc.(Non-Drug; Combo Route) route once daily.     * ASPIR-81 ORAL  Take by mouth once daily. Instructed to stop on 11/8/18 for surgery on 11/12/18 per Dr. Galloway     * aspirin 325 MG EC tablet  Commonly known as:  ECOTRIN  Take 1 tablet (325 mg total) by mouth 2 (two) times daily. Take an antacid with medication. for 42 doses     cloNIDine 0.1 MG tablet  Commonly known as:  CATAPRES  Take 0.1 mg by mouth daily as needed.     diclofenac potassium 50 mg Pwpk  Commonly known as:  CAMBIA  Take by mouth once daily.     FAMOTIDINE ORAL  20 mg 3 (three) times daily.     hydrALAZINE 50 MG tablet  Commonly known as:  APRESOLINE  Take 50 mg by mouth 2 (two) times daily.     hydroCHLOROthiazide 25 MG tablet  Commonly known as:  HYDRODIURIL  Take 25 mg by mouth once daily.     ibuprofen 800 MG tablet  Commonly known as:  ADVIL,MOTRIN  Take 800 mg by mouth 3 (three) times daily.     lisinopril 20 MG tablet  Commonly known as:  PRINIVIL,ZESTRIL  Take 20 mg by mouth once daily.     oxyCODONE-acetaminophen  mg per tablet  Commonly known as:  PERCOCET  Take 1 tablet by mouth every 6 (six) hours as needed. Take stool softener with this medication     promethazine 25 MG tablet  Commonly known as:  PHENERGAN  Take 1 tablet (25 mg total) by mouth every 6 (six) hours as needed for Nausea.     rosuvastatin 20 MG tablet  Commonly known as:  CRESTOR  Take 20 mg by mouth once daily.     * traMADol 50 mg tablet  Commonly known as:  ULTRAM  Take 50 mg by mouth every 6 (six) hours as needed for Pain.     * traMADol 50 mg tablet  Commonly known as:  ULTRAM  Take 1 tablet (50 mg total) by mouth every 6 (six) hours as needed for Pain.         * This list has 4 medication(s) that are the same as other medications prescribed for you. Read the directions carefully, and ask your doctor or other care provider to review them with you.              No discharge procedures on  file.

## 2018-11-12 NOTE — ANESTHESIA POSTPROCEDURE EVALUATION
Anesthesia Post Evaluation    Patient: Juan Bennett    Procedure(s) Performed: Procedure(s) (LRB):  REPAIR, ROTATOR CUFF, ARTHROSCOPIC (Left)  ARTHROSCOPY, SHOULDER, WITH SUBACROMIAL SPACE DECOMPRESSION (Left)  EXCISION, CLAVICLE, DISTAL (Left)    Final Anesthesia Type: general  Patient location during evaluation: PACU  Patient participation: Yes- Able to Participate  Level of consciousness: awake and alert  Post-procedure vital signs: reviewed and stable  Pain management: adequate  Airway patency: patent  PONV status at discharge: No PONV  Anesthetic complications: no      Cardiovascular status: blood pressure returned to baseline  Respiratory status: unassisted and room air  Hydration status: euvolemic  Follow-up not needed.        Visit Vitals  /78 (BP Location: Left arm, Patient Position: Lying)   Pulse 88   Temp 36.7 °C (98.1 °F) (Oral)   Resp 18   Ht 6' (1.829 m)   Wt 122 kg (269 lb 0 oz)   SpO2 (!) 93%   BMI 36.48 kg/m²       Pain/Aaron Score: Pain Assessment Performed: Yes (11/12/2018  6:00 AM)  Presence of Pain: denies (11/12/2018 11:32 AM)  Pain Rating Prior to Med Admin: 5 (11/12/2018 12:45 PM)  Aaron Score: 8 (11/12/2018 11:32 AM)

## 2018-11-12 NOTE — TRANSFER OF CARE
Anesthesia Transfer of Care Note    Patient: Juan Bennett    Procedure(s) Performed: Procedure(s) (LRB):  REPAIR, ROTATOR CUFF, ARTHROSCOPIC (Left)  ARTHROSCOPY, SHOULDER, WITH SUBACROMIAL SPACE DECOMPRESSION (Left)  EXCISION, CLAVICLE, DISTAL (Left)    Patient location: PACU    Anesthesia Type: general    Transport from OR: Transported from OR on 2-3 L/min O2 by NC with adequate spontaneous ventilation    Post pain: adequate analgesia    Post assessment: no apparent anesthetic complications    Post vital signs: stable    Level of consciousness: awake, alert and oriented    Nausea/Vomiting: no nausea/vomiting    Complications: none    Transfer of care protocol was followed      Last vitals:   Visit Vitals  /81 (BP Location: Right arm, Patient Position: Sitting)   Pulse 73   Temp 36.8 °C (98.3 °F) (Oral)   Resp 16   Ht 6' (1.829 m)   Wt 122 kg (269 lb 0 oz)   SpO2 98%   BMI 36.48 kg/m²

## 2018-11-12 NOTE — PLAN OF CARE
Patient transported to room 356 via transport.  Called floor nurse to let her know pain meds given prior to transport- pain reassessment needed.

## 2018-11-12 NOTE — PLAN OF CARE
Second attempt to call report - nurse that  Is to take patient unable to receive report at this time.  States she will call back - family made aware and states understanding.

## 2018-11-12 NOTE — PLAN OF CARE
Patient came to ACU with discharge orders.  When wife entered room, stated taht they are supposed to stay overnight.  Called MD- extended recovery orders had not yet been placed.  Orders clarified and patient is to be in extended recovery.

## 2018-11-12 NOTE — INTERVAL H&P NOTE
The patient has been examined and the H&P has been reviewed:    I concur with the findings and no changes have occurred since H&P was written.    Anesthesia/Surgery risks, benefits and alternative options discussed and understood by patient/family.          Active Hospital Problems    Diagnosis  POA    Nontraumatic tear of left rotator cuff [M75.102]  Yes      Resolved Hospital Problems   No resolved problems to display.

## 2018-11-12 NOTE — ANESTHESIA PROCEDURE NOTES
Left ISB    Patient location during procedure: holding area   Block not for primary anesthetic.  Reason for block: at surgeon's request and post-op pain management   Post-op Pain Location: Left shoulder  Timeout: 11/12/2018 6:51 AM   End time: 11/12/2018 7:00 AM  Surgery related to: arthroscopy  Staffing  Anesthesiologist: Shaji Spann MD  Performed: anesthesiologist   Preanesthetic Checklist  Completed: patient identified, site marked, surgical consent, pre-op evaluation, timeout performed, IV checked, risks and benefits discussed and monitors and equipment checked  Peripheral Block  Patient position: right lateral decubitus  Prep: ChloraPrep and site prepped and draped  Patient monitoring: heart rate, cardiac monitor, continuous pulse ox and frequent blood pressure checks  Block type: interscalene  Laterality: left  Injection technique: single shot  Needle  Needle type: Echogenic   Needle gauge: 21 G  Needle length: 4 in  Needle localization: ultrasound guidance and anatomical landmarks   -ultrasound image captured on disc.  Assessment  Injection assessment: negative aspiration, negative parasthesia and local visualized surrounding nerve  Paresthesia pain: none  Heart rate change: no  Slow fractionated injection: yes

## 2018-11-12 NOTE — OP NOTE
DATE OF SURGERY:11/12/2018        PREOPERATIVE DIAGNOSES:   1. Right shoulder rotator cuff tear   2. Right shoulder labral tear  3. Right shoulder AC joint arthritis     POSTOPERATIVE DIAGNOSES:   1. Right shoulder rotator cuff tear   2. Right shoulder labral tear  3. Right shoulder AC joint arthritis     PROCEDURE:   1. Right shoulder arthroscopic rotator cuff repair supraspinatus   2. Placement of a right shoulder collagen scaffold allograft augmentation on posterosuperior rotator cuff repair  3. Right shoulder arthroscopic debridement labrum  4. Right shoulder arthroscopic distal clavicle excision   5. Right shoulder arthroscopic subacromial decompression.      SURGEON: Shaji Galloway M.D.      ASSISTANTS:   1. Tate Luo M.D.   2. Dinah Ignacio     COMPLICATIONS: None.      POSITION: Beach chair.      ANESTHESIA: General endotracheal plus right upper extremity interscalene block.      EXAMINATION UNDER ANESTHESIA: Right shoulder forward flexion 180 degrees,   abduction 120 degrees, full internal and external rotation   1+ anterior drawer, 1+ sulcus sign, 1+ posterior drawer.      ARTHROSCOPIC FINDINGS:   1. Full thickness, large U-shaped supraspinatus rotator cuff tear.   2. Small anterior acromial spur.   3. Arthritic distal clavicle  4. Intact glenohumeral cartilage surface  5. Biceps: intact  6. Subscapularis: intact  7. Labrum:  degenerative SLAP1     GRAFT USED:  Rotation Medical Bovine Achilles allograft, Large with fixation      INDICATIONS FOR PROCEDURE: The patient is a 43 y.o. male  who has pain in his right shoulder. MRI confirms a tear of his rotator cuff including his supraspinatus.  After risks and benefits of surgery were discussed, he elects to proceed with operative  intervention. All risks and benefits have been discussed including the risks of stiffness for recurrent instability, irreparability of the tear, damage to neurovascular structures, damage to cartilage and the risk of  anesthesia including stroke and heart attack. The patient seemed to understand and wished to proceed.      DESCRIPTION OF PROCEDURE: The patient was brought in the room. After undergoing general endotracheal anesthesia and right upper extremity interscalene block, he was placed in a well-padded modified beachchair position. Perioperative antibiotics were given.  His right upper extremity was prepped and draped in usual sterile fashion including the use of a sterile Spider arm valiente.      After time-out was performed, the standard posterior portal and anterior superior portal were created. Diagnostic arthroscopy performed. The glenohumeral joint was entered. There was no chondromalacia noted in the glenoid or humeral head. There was mild fraying at the superior labrum. The anterior inferior labrum and posterior labrum were intact without evidence of tearing. The subscapularis was intact.       There was large full-thickness tear of the supraspinatus tendon.      DEBRIDEMENT: Oscillating shaver, straight and curved biters were used to debride a small flap of tissue off the superior labrum. The biceps was intact     SUBACROMIAL DECOMPRESSION: The scope was taken out and redirected in the subacromial space. After excellent visualization was achieved, a lateral portal was created. The bursal tissue was cleaned off. The full-thickness large size rotator cuff tear was identified. The area was cleaned off for later repair. The undersurface of the acromion was cleaned off of soft tissues including releasing the CA ligament. A 5-mm sneha was introduced through the posterior portal and decompression was completed using cutting block technique down to a type 1 configuration.      DISTAL CLAVICLE EXCISION:  The soft tissues were cleaned off of the undersurface of the AC joint with Mitek VAPR device. The arthritic aspect of the distal clavicle was visualized and excellent hemostasis was achieved.  A 5-mm sneha was used to resect  between 8-9 mm of bone from the distal aspect of the clavicle.  Careful attention was paid to resect adequate bone from the posterior and superior aspect of the AC joint and not to disrupt the superior aspect of the AC joint capsule.    ROTATOR CUFF REPAIR: The footprint of rotator cuff was cleaned off with Mitek VAPR device and oscillating shaver / sneha. This was judged to be amenable for repair with convergence sutures and a suture anchor.      Margin convergence sutures were placed starting posterior-medially and finishing larry-laterally.  A total of 3 margin convergence sutures were placed which converged our cuff tissue well and balanced our repair from posterior to anterior.  Our rotator cuff tissue covered the humeral head and was brought to cover the footprint.  The tissue was mobile enough for suture anchor repair on the to cover the supraspinatus and infraspinatus footprint.     One 5.5 mm anchor was placed at the central aspect of the footprint of the supraspinatus. All 6 suture limbs from the anchor were brought through in horizontal mattress type sutures configuration using a suture penetrator and Esspressew. These were tied arthroscopically down from lateral to medial. Excellent footprint coverage was achieved after all arthroscopic knots were tied down. Internal and external rotation confirmed excellent footprint compression with no evidence of gap formation.      PLACEMENT OF COLLAGEN SCAFFOLD ALLOGRAFT:  The Large Rotation Medical Collagen scaffold was prepared in the usual fashion.  The graft was inserted through the lateral portal and laid down directly onto the superior surface of the U-shaped portion of the cuff.  The graft covered the repair site well and was tacked down with 5 tendon staples along the graft medially, anterior and posteriorly. Two bone staples were placed without difficulty laterally.  Excellent coverage was achieved which rotated with the cuff repair following final  implantation.     Portal sites were closed with 4-0 Monocryl, covered with Mastisol, Steri-Strips, Xeroform, 4 x 4 fluffs, ABD pads and tape. The patient was placed in a sling and pillow for protection, was extubated in the room, transferred to recovery room in stable condition accompanied by his physician.     There were no complications in the case. I was present, scrubbed and did perform all critical portions of the case.      Postop plan for the patient is to follow the large size rotator cuff repair protocol.            Shaji Galloway M.D.

## 2018-11-12 NOTE — OR NURSING
Called Dr. Galloway for clarification of discharge order.  Dr. Galloway wants to keep the patient overnight and discharge in the am.  Orders placed for Extended recovery.

## 2018-11-13 VITALS
SYSTOLIC BLOOD PRESSURE: 142 MMHG | WEIGHT: 269 LBS | RESPIRATION RATE: 18 BRPM | TEMPERATURE: 98 F | HEIGHT: 72 IN | BODY MASS INDEX: 36.44 KG/M2 | HEART RATE: 82 BPM | OXYGEN SATURATION: 96 % | DIASTOLIC BLOOD PRESSURE: 97 MMHG

## 2018-11-13 PROCEDURE — 25000003 PHARM REV CODE 250: Performed by: ORTHOPAEDIC SURGERY

## 2018-11-13 PROCEDURE — 97535 SELF CARE MNGMENT TRAINING: CPT

## 2018-11-13 PROCEDURE — G8987 SELF CARE CURRENT STATUS: HCPCS | Mod: CK

## 2018-11-13 PROCEDURE — 97165 OT EVAL LOW COMPLEX 30 MIN: CPT

## 2018-11-13 PROCEDURE — 94799 UNLISTED PULMONARY SVC/PX: CPT

## 2018-11-13 PROCEDURE — 94761 N-INVAS EAR/PLS OXIMETRY MLT: CPT

## 2018-11-13 PROCEDURE — G8989 SELF CARE D/C STATUS: HCPCS | Mod: CK

## 2018-11-13 PROCEDURE — 25000003 PHARM REV CODE 250: Performed by: PHYSICIAN ASSISTANT

## 2018-11-13 PROCEDURE — G8988 SELF CARE GOAL STATUS: HCPCS | Mod: CK

## 2018-11-13 RX ORDER — CYCLOBENZAPRINE HCL 5 MG
10 TABLET ORAL ONCE
Status: COMPLETED | OUTPATIENT
Start: 2018-11-13 | End: 2018-11-13

## 2018-11-13 RX ORDER — OXYCODONE AND ACETAMINOPHEN 10; 325 MG/1; MG/1
1 TABLET ORAL EVERY 4 HOURS PRN
Status: DISCONTINUED | OUTPATIENT
Start: 2018-11-13 | End: 2018-11-13 | Stop reason: HOSPADM

## 2018-11-13 RX ORDER — TRAMADOL HYDROCHLORIDE 50 MG/1
50 TABLET ORAL ONCE
Status: COMPLETED | OUTPATIENT
Start: 2018-11-13 | End: 2018-11-13

## 2018-11-13 RX ORDER — ALPRAZOLAM 0.5 MG/1
0.5 TABLET ORAL ONCE
Status: COMPLETED | OUTPATIENT
Start: 2018-11-13 | End: 2018-11-13

## 2018-11-13 RX ORDER — TRAMADOL HYDROCHLORIDE 50 MG/1
50 TABLET ORAL EVERY 8 HOURS PRN
Status: DISCONTINUED | OUTPATIENT
Start: 2018-11-13 | End: 2018-11-13 | Stop reason: HOSPADM

## 2018-11-13 RX ADMIN — OXYCODONE HYDROCHLORIDE AND ACETAMINOPHEN 1 TABLET: 10; 325 TABLET ORAL at 06:11

## 2018-11-13 RX ADMIN — ALPRAZOLAM 0.5 MG: 0.5 TABLET ORAL at 10:11

## 2018-11-13 RX ADMIN — TRAMADOL HYDROCHLORIDE 50 MG: 50 TABLET, FILM COATED ORAL at 07:11

## 2018-11-13 RX ADMIN — TRAMADOL HYDROCHLORIDE 50 MG: 50 TABLET, FILM COATED ORAL at 04:11

## 2018-11-13 RX ADMIN — OXYCODONE HYDROCHLORIDE AND ACETAMINOPHEN 1 TABLET: 10; 325 TABLET ORAL at 12:11

## 2018-11-13 RX ADMIN — CYCLOBENZAPRINE HYDROCHLORIDE 10 MG: 5 TABLET, FILM COATED ORAL at 04:11

## 2018-11-13 RX ADMIN — OXYCODONE HYDROCHLORIDE AND ACETAMINOPHEN 1 TABLET: 10; 325 TABLET ORAL at 10:11

## 2018-11-13 NOTE — PLAN OF CARE
SW met with pt at bedside to oomplete discharge assessment, verified PCP and uses [facility] pharmacy [phone].  Pt spouse will provide transportation home.  Pt has Outpt PT scheduled at [facility] Thurs 11/15/18.  No needs identified at this time.     11/13/18 0915   Discharge Assessment   Assessment Type Discharge Planning Assessment   Confirmed/corrected address and phone number on facesheet? Yes   Assessment information obtained from? Patient   Communicated expected length of stay with patient/caregiver no   Prior to hospitilization cognitive status: Alert/Oriented   Prior to hospitalization functional status: Independent   Current cognitive status: Alert/Oriented   Current Functional Status: Needs Assistance   Lives With spouse   Able to Return to Prior Arrangements yes   Is patient able to care for self after discharge? Unable to determine at this time (comments)   Readmission Within The Last 30 Days no previous admission in last 30 days   Patient currently being followed by outpatient case management? No   Patient currently receives any other outside agency services? No   Equipment Currently Used at Home none   Do you have any problems affording any of your prescribed medications? No   Is the patient taking medications as prescribed? yes   Does the patient have transportation home? Yes   Transportation Available family or friend will provide   Discharge Plan A Home   Patient/Family In Agreement With Plan yes

## 2018-11-13 NOTE — NURSING
Paged on call resident for patient pain level. continuous cooling in place now and not providing relief. Will continue to monitor.

## 2018-11-13 NOTE — PT/OT/SLP EVAL
Occupational Therapy   Evaluation, Treatment and Discharge Note    Name: Juan Bennett  MRN: [id number]  Admitting Diagnosis:  Nontraumatic tear of left rotator cuff 1 Day Post-Op    Recommendations:     Discharge Recommendations: outpatient OT  Discharge Equipment Recommendations:  none  Barriers to discharge:  Inaccessible home environment    History:     Occupational Profile:  Living Environment: Patient resides with his spouse in one story home with 10 steps to enter and (B) HRs.  Tub/shower combination and standard toilet.  Previous level of function: Independent  Roles and Routines:   Equipment Owned:  none  Assistance upon Discharge: Spouse    Past Medical History:   Diagnosis Date    Hypercholesteremia     Hypertension     Stroke 06/2018       Past Surgical History:   Procedure Laterality Date    APPENDECTOMY      ARTHROSCOPIC REPAIR OF ROTATOR CUFF OF SHOULDER Left 11/12/2018    Procedure: REPAIR, ROTATOR CUFF, ARTHROSCOPIC;  Surgeon: Shaji Galloway MD;  Location: [facility];  Service: Orthopedics;  Laterality: Left;  regional w/o catheter     ARTHROSCOPY OF SHOULDER WITH DECOMPRESSION OF SUBACROMIAL SPACE Left 11/12/2018    Procedure: ARTHROSCOPY, SHOULDER, WITH SUBACROMIAL SPACE DECOMPRESSION;  Surgeon: Shaji Galloway MD;  Location: [facility];  Service: Orthopedics;  Laterality: Left;    ARTHROSCOPY, SHOULDER, WITH SUBACROMIAL SPACE DECOMPRESSION Left 11/12/2018    Performed by [facility] at [facility] OR    CHOLECYSTECTOMY      DISTAL CLAVICLE EXCISION Left 11/12/2018    Procedure: EXCISION, CLAVICLE, DISTAL;  Surgeon: Shaji Galloway MD;  Location: [facility];  Service: Orthopedics;  Laterality: Left;    ELBOW SURGERY Right     EXCISION, CLAVICLE, DISTAL Left 11/12/2018    Performed by [facility] at [facility] OR    HAND SURGERY Right     MOUTH SURGERY      REPAIR, ROTATOR CUFF, ARTHROSCOPIC Left 11/12/2018    Per[facility] by [facility] at [facility] OR     WRIST SURGERY         Subjective     Chief Complaint: Pain  Patient/Family stated goals: Home, less pain  Communicated with: NICHOLAS Judd prior to session.  Pain/Comfort:  · Pain Rating 1: other (see comments)(11/10)  · Location - Side 1: Left  · Location - Orientation 1: generalized  · Location 1: shoulder  · Pain Addressed 1: Pre-medicate for activity, Reposition, Distraction, Nurse notified, Cessation of Activity  · Pain Rating Post-Intervention 1: other (see comments)(11/10)    Patients cultural, spiritual, Mandaen conflicts given the current situation: None per chart review    Objective:     Patient found with: peripheral IV, cryotherapy    General Precautions: Standard, fall   Orthopedic Precautions:LUE non weight bearing   Braces: Shoulder abduction brace     Occupational Performance:    Bed Mobility:    · Patient completed Scooting/Bridging with supervision  · Patient completed Supine to Sit with supervision    Functional Mobility/Transfers:  · Patient completed Sit <> Stand Transfer with stand by assistance  with  no assistive device   · Functional Mobility: SBA within room, no AD    Activities of Daily Living:  · Upper Body Dressing: maximal assistance for button up shirt seated at EOB  · Lower Body Dressing: moderate assistance undergarment, jeans and socks    Cognitive/Visual Perceptual:  Cognitive/Psychosocial Skills:     -       Oriented to: Person, Place, Time and Situation   -       Follows Commands/attention:Follows two-step commands  -       Communication: clear/fluent  -       Memory: No Deficits noted  -       Safety awareness/insight to disability: intact   -       Mood/Affect/Coping skills/emotional control: Appropriate to situation  Visual/Perceptual:      -Intact     Physical Exam:  Postural examination/scapula alignment:    -       Rounded shoulders  Skin integrity: Visible skin intact  Edema:  Moderate (L) UE  Sensation:    -       Intact  light/touch (B) UEs   Upper Extremity Range of  "Motion:     -       Right Upper Extremity: WFL   -       Left Upper Extremity: deferred; hand/wrist WFL   Upper Extremity Strength:    -       Right Upper Extremity: WFL ; (L) UE Deferred   Strength: Good  Gross motor coordination:   WFL    Patient left standing with all lines intact, call button in reach, RN notified and spouse present    [facility] 6 Click:  [facility] Total Score: 17    Treatment & Education:  Educated on role of OT, UBD technique, surgeon approved (L) shoulder therex. Patient and spouse verbalized understanding.  Education:    Assessment:     Juan Bennett is a 43 y.o. male with a medical diagnosis of Nontraumatic tear of left rotator cuff. Initial OT evaluation completed.  Patient preparing for discharge; assisted with dressing, verbally reviewed (L) shoulder therex (patient declined to perform secondary to pain).  Patient and spouse verbalized understanding dressing technique, approved therex, (L) UE NWB.  Discharge from acute OT services and recommend OP OT.    Clinical Decision Makin.  OT Low:  "Pt evaluation falls under low complexity for evaluation coding due to performance deficits noted in 1-3 areas as stated above and 0 co-morbities affecting current functional status. Data obtained from problem focused assessments. No modifications or assistance was required for completion of evaluation. Only brief occupational profile and history review completed."     Plan:     During this hospitalization, patient does not require further acute OT services.  Please re-consult if situation changes.    · Plan of Care Reviewed with: patient, spouse    This Plan of care has been discussed with the patient who was involved in its development and understands and is in agreement with the identified goals and treatment plan    GOALS:   Multidisciplinary Problems     Occupational Therapy Goals     Not on file          Multidisciplinary Problems (Resolved)        Problem: Occupational Therapy Goal    " Goal Priority Disciplines Outcome Interventions   Occupational Therapy Goal   (Resolved)     OT, PT/OT Outcome(s) achieved                    Time Tracking:     OT Date of Treatment: 11/13/18  OT Start Time: 0903  OT Stop Time: 0934  OT Total Time (min): 31 min    Billable Minutes:Evaluation 20  Self Care/Home Management 11    MARY Jorge  11/13/2018

## 2018-11-13 NOTE — SUBJECTIVE & OBJECTIVE
"  Subjective:     Principal Problem:Nontraumatic tear of left rotator cuff    Follow-up For: Procedure(s) (LRB):  REPAIR, ROTATOR CUFF, ARTHROSCOPIC (Left)  ARTHROSCOPY, SHOULDER, WITH SUBACROMIAL SPACE DECOMPRESSION (Left)  EXCISION, CLAVICLE, DISTAL (Left)  Post-Operative Day: 1 Day Post-Op      Interval History:  Patient seen and examined. Doing well without new complains. Pain is well controlled. No fever, chills, nausea, vomit, diarrhea, shortness-of-breath, chest pain. Discussed treatment and discharge plans.    Objective:     Vitals:    18 1455 18 1938 18 0045 18 0456   BP: 134/81 (!) 159/91 (!) 156/86 (!) 150/90   BP Location: Left arm Right arm Right arm Right arm   Patient Position: Lying Lying Lying Lying   Pulse: 73 64 104 79   Resp: 18 16 17 17   Temp:  97.4 °F (36.3 °C) 98.4 °F (36.9 °C) 98.1 °F (36.7 °C)   TempSrc:  Oral Oral Oral   SpO2: 95% (!) 94% (!) 94% (!) 94%   Weight:  122 kg (269 lb 0 oz)     Height:  6' (1.829 m)       Temp (48hrs), Av.1 °F (36.7 °C), Min:97.4 °F (36.3 °C), Max:98.4 °F (36.9 °C)      General    Constitutional: No distress.   HENT:   Head: Atraumatic.   Cardiovascular: Normal rate and intact distal pulses.    Pulmonary/Chest: Effort normal. No respiratory distress.   Neurological: He is alert.   Psychiatric: He has a normal mood and affect.             Left Shoulder Exam     Inspection/Observation   Swelling: present  Scars: present    Tenderness   The patient is tender to palpation of the greater tuberosity, supraspinatus, acromioclavicular joint and biceps tendon.    Comments:  Dressings c/d/i. [place] to m/u/r nerves. Motor intact to ain/pin/u nerves. Radial pulse 2+        Drain: no drain  Drain Output  1901 - 1900  In: 1150   Out: 0     Significant Labs: {Results:98774::"All pertinent labs within the past 24 hours have been reviewed."}  "

## 2018-11-13 NOTE — NURSING
Discharge instructions reviewed with pt and spouse at length and verbalized 100% understanding. Extra mepore dsg sent home with pt

## 2018-11-13 NOTE — NURSING
"Pt refuses hydralazine. States " I take that one time in the mornings and I already took it today. I take 50mg once in the morning." will call MD and continue to monitor.   "

## 2018-11-13 NOTE — NURSING
Pt complains of pain 10/10, po pain medication given. Pt refuses continuous cooling on shoulder. Pt educated that the continuous cooling will help with inflammation and the pain. Pt refused IV attempt during day shift. Will continue to monitor.

## 2018-11-13 NOTE — ASSESSMENT & PLAN NOTE
S/p L RCR POD1    -NWB LUE  -ok for codmans/ pendulums. No active ROM. No ER past 30 degrees  -sling/abduction pillow for 6 weeks  -dressing change pod3  -Will do  PT as outpatient  - mg for DVT ppx  -follow up 10-14 days with Dr. Galloway.

## 2018-11-13 NOTE — PLAN OF CARE
Problem: Patient Care Overview  Goal: Plan of Care Review  Outcome: Ongoing (interventions implemented as appropriate)  VSS and afebrile, aaox4. continuous cooling and immobilizer applied to arm. Neurovascular checks WNL. Voiding adequately and tolerating regular diet.  Purposeful rounding done. Pain mildly controlled with breakthrough PRN pain medication. Plan of care reviewed with patient and spouse. Spouse at bedside, call light at bedside, bed at lowest position, brakes on, non skid socks on. Will continue to monitor.

## 2018-11-13 NOTE — PLAN OF CARE
Problem: Patient Care Overview  Goal: Plan of Care Review  Outcome: Ongoing (interventions implemented as appropriate)  Patient on room air SPO2 95% IS done with good effort.

## 2018-11-13 NOTE — PLAN OF CARE
11/13/18 0948   Final Note   Assessment Type Final Discharge Note   Anticipated Discharge Disposition Home  (outpatient PT )   What phone number can be called within the next 1-3 days to see how you are doing after discharge? [phone]   Hospital Follow Up  Appt(s) scheduled? Yes   Discharge plans and expectations educations in teach back method with documentation complete? Yes   Right Care Referral Info   Post Acute Recommendation No Care

## 2018-11-13 NOTE — PLAN OF CARE
Problem: Occupational Therapy Goal  Goal: Occupational Therapy Goal  Outcome: Outcome(s) achieved Date Met: 11/13/18  Initial OT evaluation completed.  Patient preparing for discharge; assisted with dressing, verbally reviewed (L) shoulder therex (patient declined to perform secondary to pain).  Patient and spouse verbalized understanding dressing technique, approved therex, (L) MACO CASTILLO.  Discharge from acute OT services and recommend OP OT.    Comments: MARY Jorge, 11/13/2018

## 2018-11-13 NOTE — NURSING
8:46 AM  In agreement and eager for DC.  Post operative limb neurovascularly intact.  OT to eval and treat, polar care packed for pt.  VU of DC instructions, paperwork and prescriptions delivered.  To be DC home with family.  Will be escorted downstairs via  transport team once dressed and ready.  Free from falls or skin breakdown this hospital admission.

## 2018-11-19 DIAGNOSIS — Z98.890 S/P ROTATOR CUFF REPAIR: Primary | ICD-10-CM

## 2018-11-19 RX ORDER — OXYCODONE AND ACETAMINOPHEN 5; 325 MG/1; MG/1
1 TABLET ORAL EVERY 6 HOURS PRN
Qty: 28 TABLET | Refills: 0 | Status: SHIPPED | OUTPATIENT
Start: 2018-11-19 | End: 2018-11-27

## 2018-11-19 NOTE — TELEPHONE ENCOUNTER
----- Message from Petey Washburn sent at 11/19/2018 12:35 PM CST -----  Contact: Wife-Kelsea   Pt is requesting a call back regarding oxyCODONE-acetaminophen (PERCOCET)     mg per tablet and traMADol (ULTRAM) 50 mg tablet, she states medication has pt feeling down. Pt wife also states pt needs to be scheduled for PT in [place]. Pt wife can be reached at [phone].

## 2018-11-21 ENCOUNTER — CLINICAL SUPPORT (OUTPATIENT)
Dept: REHABILITATION | Facility: HOSPITAL | Age: 43
End: 2018-11-21

## 2018-11-21 ENCOUNTER — PATIENT MESSAGE (OUTPATIENT)
Dept: SPORTS MEDICINE | Facility: CLINIC | Age: 43
End: 2018-11-21

## 2018-11-21 ENCOUNTER — TELEPHONE (OUTPATIENT)
Dept: SPORTS MEDICINE | Facility: CLINIC | Age: 43
End: 2018-11-21

## 2018-11-21 DIAGNOSIS — G89.18 ACUTE POSTOPERATIVE PAIN OF LEFT SHOULDER: Primary | ICD-10-CM

## 2018-11-21 DIAGNOSIS — Z98.890 S/P SHOULDER SURGERY: Primary | ICD-10-CM

## 2018-11-21 DIAGNOSIS — M25.512 ACUTE POSTOPERATIVE PAIN OF LEFT SHOULDER: Primary | ICD-10-CM

## 2018-11-21 DIAGNOSIS — M75.102 NONTRAUMATIC TEAR OF LEFT ROTATOR CUFF: ICD-10-CM

## 2018-11-21 PROCEDURE — 97161 PT EVAL LOW COMPLEX 20 MIN: CPT

## 2018-11-21 PROCEDURE — 97110 THERAPEUTIC EXERCISES: CPT

## 2018-11-21 RX ORDER — HYDROCODONE BITARTRATE AND ACETAMINOPHEN 10; 325 MG/1; MG/1
1 TABLET ORAL EVERY 6 HOURS PRN
Qty: 28 TABLET | Refills: 0 | Status: SHIPPED | OUTPATIENT
Start: 2018-11-21 | End: 2018-11-27 | Stop reason: SDUPTHER

## 2018-11-21 NOTE — TELEPHONE ENCOUNTER
----- Message from Darell Stratton sent at 11/21/2018  9:18 AM CST -----  Contact: Barbara/[facility]  Questions regarding Norco and Percocet dispense (which medication to give)    Please call Barbara at [facility], [facility]. - [place] - [phone] [phone] (Phone)  [phone] (Fax)    Thank you

## 2018-11-21 NOTE — TELEPHONE ENCOUNTER
Spoke with Barbara, let her know to cancel the Percocet rx from Dr. Luo due to pt having side effects and fill the Norco rx sent by Monserrat Campbell PA-C

## 2018-11-21 NOTE — PROGRESS NOTES
"PHYSICAL THERAPY INITIAL OUTPATIENT EVALUATION      Referring Provider:  Dr Shaji Galloway     Diagnosis:       ICD-10-CM ICD-9-CM    1. Acute postoperative pain of left shoulder G89.18 719.41     M25.512 338.18    2. Nontraumatic tear of left rotator cuff M75.102 727.61         Orders:  Evaluate and Treat   Date of Initial Evaluation: 11/21/2018      Visit # 1     SUBJECTIVE:  Patient reports he possibly had a stroke in June and around the same time he developed left shoulder pain. Pain was worse with everything including moving it and getting dressed. He wasn't sleeping at night due to shoulder pain. Tried medicine with no relief. He went to [facility] and was given a shoulder injection 1 month ago that did not help. At [facility] in  he was told that he is not a surgical candidate because "he can't move his arm." He cannot attend PT due to financial reasons. 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 He then came to [facility] in October , ended up seeing Dr Shaji Galloway and decision made to do surgery .      Surgery 11-12-18 :   1. Left shoulder arthroscopic rotator cuff repair supraspinatus   2. Placement of a left shoulder collagen scaffold allograft augmentation on posterosuperior rotator cuff repair  3. Left shoulder arthroscopic debridement labrum  4. left shoulder arthroscopic distal clavicle excision   5. left  shoulder arthroscopic subacromial decompression.     Has been doing fairly well since the surgery. Had a reaction to meds at first but corrected now. Is using cold and estim at home frequently. Has been doing wrist exercises and shoulder shrugs as well as fingers. Pain about shoulder as expected but he has been managing with little to no meds in past few days.      Current Pain: 8 /10          Worst pain: 9-10 /10  Best pain:  6 /10    Function: Patient reports 93% disability based on score of the Upper Extremity Functional Scale.    OBJECTIVE:    Observation /  Posture :  " Pt wearing sling in abduction pillow , TENS pads in place about GH joint . Mild swelling in forearm / hand . No redness about incision sites - appear to be healing well.      Shoulder PROM:   Left   Flexion 45   Extension ---   Abduction ---   Internal Rotation ----   External Rotation 10         Other ROM:   Elbow: flexion = full     extension = -20 active / WFL passive   Pronation= Full   Supination: mild loss   Wrist: WFL all directions   Hand: WFL all      Palpation:  Mild to moderate tenderness about GH joint .          ASSESSMENT:  The patient is a 43 y.o. year old male who presents to physical therapy s/p rotator cuff repair to left shoulder. Instructions to follow protocol for large repair. Patient's impairments include loss of ROM and strength as would be expected at this stage post operatively. He does have good ROM through the wrist and hand , as well as the elbow - except for a mild loss of elbow extension and supination. Likely due to prolonged period held in flexed posture and expected to improve quickly. These impairments are limiting patient's ability to perform ADLs.  Patient's prognosis is excelent.  Patient will benefit from skilled physical therapy intervention to restore shoulder function along the standard rotator cuff protocol. Initial period will focus on gradual return of ROM.     Short Term Goals:  1-4 weeks  1. Pt will report a subjective decrease in pain / symptoms   2. Pt will be instructed in and independent in basic home exercises / self care      Long Term Goals: 8-12 weeks  1. PROM to full all directions   2. 4/5 MMT for shoulder motions at 12 weeks   3. Full active ROM by 16 weeks post operatively   4. Minimal to no pain with all ADLs    TREATMENT PROVIDED:  -Evaluation  -Manual Therapy: ----  -Therapeutic Exercise:  PROM for flexion , AAROM for elbow all directions , and AROM for wrist. Instructed in HEP for wrist and elbow .   -Modalities: ---  -Education on HEP, condition and  posture, activity modification. Discussed POC and protocol. Answered questions .     PLAN:  Patient will benefit from physical therapy (1-2) x/week for (16) weeks including manual therapy, therapeutic exercise, functional activities, modalities, and patient education.    Thank you for this referral.      Leonides Laboy, PT, OCS, FAAOMPT

## 2018-11-27 ENCOUNTER — OFFICE VISIT (OUTPATIENT)
Dept: SPORTS MEDICINE | Facility: CLINIC | Age: 43
End: 2018-11-27

## 2018-11-27 VITALS
WEIGHT: 269 LBS | HEART RATE: 90 BPM | DIASTOLIC BLOOD PRESSURE: 74 MMHG | BODY MASS INDEX: 36.44 KG/M2 | SYSTOLIC BLOOD PRESSURE: 125 MMHG | HEIGHT: 72 IN

## 2018-11-27 DIAGNOSIS — Z98.890 S/P SHOULDER SURGERY: ICD-10-CM

## 2018-11-27 DIAGNOSIS — Z98.890 POST-OPERATIVE STATE: ICD-10-CM

## 2018-11-27 DIAGNOSIS — Z98.890 S/P ROTATOR CUFF REPAIR: Primary | ICD-10-CM

## 2018-11-27 PROCEDURE — 99999 PR PBB SHADOW E&M-EST. PATIENT-LVL III: CPT | Mod: PBBFAC,,, | Performed by: ORTHOPAEDIC SURGERY

## 2018-11-27 PROCEDURE — 99024 POSTOP FOLLOW-UP VISIT: CPT | Mod: ,,, | Performed by: ORTHOPAEDIC SURGERY

## 2018-11-27 PROCEDURE — 99213 OFFICE O/P EST LOW 20 MIN: CPT | Mod: PBBFAC,PO | Performed by: ORTHOPAEDIC SURGERY

## 2018-11-27 RX ORDER — HYDROCODONE BITARTRATE 10 MG/1
10 CAPSULE, EXTENDED RELEASE ORAL EVERY 12 HOURS
Qty: 30 EACH | Refills: 0 | Status: SHIPPED | OUTPATIENT
Start: 2018-11-27 | End: 2019-10-22

## 2018-11-27 RX ORDER — HYDROCODONE BITARTRATE AND ACETAMINOPHEN 10; 325 MG/1; MG/1
1 TABLET ORAL EVERY 4 HOURS PRN
Qty: 28 TABLET | Refills: 0 | Status: SHIPPED | OUTPATIENT
Start: 2018-11-27 | End: 2018-11-27

## 2018-11-27 RX ORDER — HYDROCODONE BITARTRATE AND ACETAMINOPHEN 10; 325 MG/1; MG/1
1 TABLET ORAL EVERY 4 HOURS PRN
Qty: 28 TABLET | Refills: 0 | Status: SHIPPED | OUTPATIENT
Start: 2018-11-27 | End: 2018-12-10 | Stop reason: SDUPTHER

## 2018-11-27 NOTE — PROGRESS NOTES
CC: Left shoulder post op 2 weeks    DATE OF SURGERY:11/12/2018        PREOPERATIVE DIAGNOSES:   1. Right shoulder rotator cuff tear   2. Right shoulder labral tear  3. Right shoulder AC joint arthritis     POSTOPERATIVE DIAGNOSES:   1. Right shoulder rotator cuff tear   2. Right shoulder labral tear  3. Right shoulder AC joint arthritis     PROCEDURE:   1. Right shoulder arthroscopic rotator cuff repair supraspinatus   2. Placement of a right shoulder collagen scaffold allograft augmentation on posterosuperior rotator cuff repair  3. Right shoulder arthroscopic debridement labrum  4. Right shoulder arthroscopic distal clavicle excision   5. Right shoulder arthroscopic subacromial decompression.      SURGEON: Shaji Galloway M.D.     Report pain is only controlled if takes Norco 10-325mg Q4hrs.  Feels tramadol does not do anything for pain.  Sling in place    Review of Systems   Constitution: Negative. Negative for chills, fever and night sweats.    Cardiovascular: Negative for chest pain and syncope.   Respiratory: Negative for cough and shortness of breath.   Gastrointestinal: Negative for abdominal pain and bowel incontinence.     PE:  Vitals:    11/27/18 1524   BP: 125/74   Pulse: 90   Weight: 122 kg (269 lb)   Height: 6' (1.829 m)   PainSc:   7     Left shoulder  Incision healed  No sign of infection  Swelling resolved  Compartments soft  Neurovascular status intact in extremity    Assessment:  Appropriate rotator cuff surgery recovery at 2 weeks    Plan:  1. Continue PT   2. Refill of [place] 325mg sent to pharmacy  3. Follow up 4 weeks

## 2018-11-28 ENCOUNTER — CLINICAL SUPPORT (OUTPATIENT)
Dept: REHABILITATION | Facility: HOSPITAL | Age: 43
End: 2018-11-28

## 2018-11-28 DIAGNOSIS — M25.512 ACUTE POSTOPERATIVE PAIN OF LEFT SHOULDER: Primary | ICD-10-CM

## 2018-11-28 DIAGNOSIS — M75.102 NONTRAUMATIC TEAR OF LEFT ROTATOR CUFF: ICD-10-CM

## 2018-11-28 DIAGNOSIS — G89.18 ACUTE POSTOPERATIVE PAIN OF LEFT SHOULDER: Primary | ICD-10-CM

## 2018-11-28 PROCEDURE — 97110 THERAPEUTIC EXERCISES: CPT

## 2018-11-28 PROCEDURE — 97014 ELECTRIC STIMULATION THERAPY: CPT

## 2018-11-29 NOTE — PROGRESS NOTES
"PHYSICAL THERAPY INITIAL OUTPATIENT EVALUATION      Referring Provider:  Dr Shaji Galloway     Diagnosis:       ICD-10-CM ICD-9-CM    1. Acute postoperative pain of left shoulder G89.18 719.41     M25.512 338.18    2. Nontraumatic tear of left rotator cuff M75.102 727.61         Orders:  Evaluate and Treat   Date of Initial Evaluation: 11/29/2018      Visit # 1     SUBJECTIVE:  Patient reports he possibly had a stroke in June and around the same time he developed left shoulder pain. Pain was worse with everything including moving it and getting dressed. He wasn't sleeping at night due to shoulder pain. Tried medicine with no relief. He went to [facility] and was given a shoulder injection 1 month ago that did not help. At [facility] in  he was told that he is not a surgical candidate because "he can't move his arm." He cannot attend PT due to financial reasons. 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 He then came to [facility] in October , ended up seeing Dr Shaji Galloway and decision made to do surgery .      Surgery 11-12-18 :   1. Left shoulder arthroscopic rotator cuff repair supraspinatus   2. Placement of a left shoulder collagen scaffold allograft augmentation on posterosuperior rotator cuff repair  3. Left shoulder arthroscopic debridement labrum  4. left shoulder arthroscopic distal clavicle excision   5. left  shoulder arthroscopic subacromial decompression.     Has been doing fairly well since the surgery. Had a reaction to meds at first but corrected now. Is using cold and estim at home frequently. Has been doing wrist exercises and shoulder shrugs as well as fingers. Pain about shoulder as expected but he has been managing with little to no meds in past few days.      Current Pain: 8 /10          Worst pain: 9-10 /10  Best pain:  6 /10    Function: Patient reports 93% disability based on score of the Upper Extremity Functional Scale.    OBJECTIVE:    Observation /  Posture :  " Pt wearing sling in abduction pillow , TENS pads in place about GH joint . Mild swelling in forearm / hand . No redness about incision sites - appear to be healing well.      Shoulder PROM:   Left   Flexion 45   Extension ---   Abduction ---   Internal Rotation ----   External Rotation 10         Other ROM:   Elbow: flexion = full     extension = -20 active / WFL passive   Pronation= Full   Supination: mild loss   Wrist: WFL all directions   Hand: WFL all      Palpation:  Mild to moderate tenderness about GH joint .       TREATMENT PROVIDED:  -Manual Therapy: ----  -Therapeutic Exercise:  PROM for flexion  IR, ER , AAROM for elbow all directions , and AROM for wrist . 5# wrist curls and extension, as well as radial deviation ( 30 mins )   -Modalities: MH and stim to shoulder x 15 mins   -Education on HEP, condition and posture, activity modification. Discussed POC and protocol. Answered questions .        ASSESSMENT:  The patient is a 43 y.o. year old male who presents to physical therapy s/p rotator cuff repair to left shoulder. Instructions to follow protocol for large repair. Patient's impairments include loss of ROM and strength as would be expected at this stage post operatively. He does have good ROM through the wrist and hand , as well as the elbow -and gradual gains in shoulder elevation . Notable pain but able to continue and progress.     Short Term Goals:  1-4 weeks  1. Pt will report a subjective decrease in pain / symptoms   2. Pt will be instructed in and independent in basic home exercises / self care      Long Term Goals: 8-12 weeks  1. PROM to full all directions   2. 4/5 MMT for shoulder motions at 12 weeks   3. Full active ROM by 16 weeks post operatively   4. Minimal to no pain with all ADLs    PLAN:  Patient will benefit from physical therapy (1-2) x/week for (16) weeks including manual therapy, therapeutic exercise, functional activities, modalities, and patient education.    Thank you for this  referral.      Leonides Laboy, PT, OCS, FAAOMPT

## 2018-12-05 ENCOUNTER — CLINICAL SUPPORT (OUTPATIENT)
Dept: REHABILITATION | Facility: HOSPITAL | Age: 43
End: 2018-12-05

## 2018-12-05 ENCOUNTER — TELEPHONE (OUTPATIENT)
Dept: SPORTS MEDICINE | Facility: CLINIC | Age: 43
End: 2018-12-05

## 2018-12-05 DIAGNOSIS — M25.512 ACUTE POSTOPERATIVE PAIN OF LEFT SHOULDER: Primary | ICD-10-CM

## 2018-12-05 DIAGNOSIS — G89.18 ACUTE POSTOPERATIVE PAIN OF LEFT SHOULDER: Primary | ICD-10-CM

## 2018-12-05 PROCEDURE — 97014 ELECTRIC STIMULATION THERAPY: CPT

## 2018-12-05 PROCEDURE — 97110 THERAPEUTIC EXERCISES: CPT

## 2018-12-05 PROCEDURE — 97140 MANUAL THERAPY 1/> REGIONS: CPT

## 2018-12-05 NOTE — PROGRESS NOTES
"PHYSICAL THERAPY OUTPATIENT VISIT       Referring Provider:  Dr Shaji Galloway     Diagnosis:       ICD-10-CM ICD-9-CM    1. Acute postoperative pain of left shoulder G89.18 719.41     M25.512 338.18         Orders:  Evaluate and Treat   Date of Initial Evaluation: 12/05/2018      Visit # 3         SUBJECTIVE:  States he has been doing HEP regularly . Shoulder pain slowly decreasing - barely taking any meds now.     HISTORY:  Patient reports he possibly had a stroke in June and around the same time he developed left shoulder pain. Pain was worse with everything including moving it and getting dressed. He wasn't sleeping at night due to shoulder pain. Tried medicine with no relief. He went to [facility] and was given a shoulder injection 1 month ago that did not help. At [facility] in  he was told that he is not a surgical candidate because "he can't move his arm." He cannot attend PT due to financial reasons. 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 He then came to [facility] in October , ended up seeing Dr Shaji Galloway and decision made to do surgery .      Surgery 11-12-18 :   1. Left shoulder arthroscopic rotator cuff repair supraspinatus   2. Placement of a left shoulder collagen scaffold allograft augmentation on posterosuperior rotator cuff repair  3. Left shoulder arthroscopic debridement labrum  4. left shoulder arthroscopic distal clavicle excision   5. left  shoulder arthroscopic subacromial decompression.     Has been doing fairly well since the surgery. Had a reaction to meds at first but corrected now. Is using cold and estim at home frequently. Has been doing wrist exercises and shoulder shrugs as well as fingers. Pain about shoulder as expected but he has been managing with little to no meds in past few days.      Current Pain: 8 /10          Worst pain: 9-10 /10  Best pain:  6 /10    Function: Patient reports 93% disability based on score of the Upper Extremity Functional " Scale.    OBJECTIVE:    Observation /  Posture :  Pt wearing sling in abduction pillow , TENS pads in place about GH joint . Mild swelling in forearm / hand . No redness about incision sites - appear to be healing well.      Shoulder PROM:   Left   Flexion 80   Extension ---   Abduction ---   Internal Rotation ----   External Rotation 10         Other ROM:   Elbow: flexion = full     extension = -5 active / WFL passive   Pronation= Full   Supination: mild loss   Wrist: WFL all directions   Hand: WFL all      Palpation:  Mild to moderate tenderness about GH joint .       TREATMENT PROVIDED:  -Manual Therapy: CSp side glide manually and opening mobs to lower CSp facets .STM to upper trap / lev scap bilaterally.  ( 10 mins)    -Therapeutic Exercise:  PROM for flexion  IR, ER , AAROM for elbow all directions , and AROM for wrist . 5# wrist curls and extension, as well as radial deviation, scapular circles x 30  ( 35 mins )   -Modalities: MH and stim to shoulder x 15 mins   -Education on HEP, condition and posture, activity modification. Discussed POC and protocol. Answered questions .        ASSESSMENT:  The patient is a 43 y.o. year old male who presents to physical therapy s/p rotator cuff repair to left shoulder. Instructions to follow protocol for large repair. Patient's impairments include loss of ROM and strength as would be expected at this stage post operatively.   Has made gains in ROM and pain levels decreasing. Will benefit from continued progress on HEP.     Short Term Goals:  1-4 weeks  1. Pt will report a subjective decrease in pain / symptoms   2. Pt will be instructed in and independent in basic home exercises / self care      Long Term Goals: 8-12 weeks  1. PROM to full all directions   2. 4/5 MMT for shoulder motions at 12 weeks   3. Full active ROM by 16 weeks post operatively   4. Minimal to no pain with all ADLs    PLAN:  Patient will benefit from physical therapy (1-2) x/week for (16) weeks  including manual therapy, therapeutic exercise, functional activities, modalities, and patient education.    Thank you for this referral.      Leonides Laboy, PT, OCS, FAAOMPT

## 2018-12-05 NOTE — TELEPHONE ENCOUNTER
Patient faxed return to work form. 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 Form completed and faxed to [phone].    Graciela Gee MA  [facility]

## 2018-12-10 DIAGNOSIS — Z98.890 S/P SHOULDER SURGERY: ICD-10-CM

## 2018-12-10 DIAGNOSIS — M19.012 ARTHRITIS OF LEFT ACROMIOCLAVICULAR JOINT: ICD-10-CM

## 2018-12-10 DIAGNOSIS — M75.22 BICEPS TENDINITIS OF LEFT UPPER EXTREMITY: ICD-10-CM

## 2018-12-10 DIAGNOSIS — Z98.890 S/P ROTATOR CUFF REPAIR: Primary | ICD-10-CM

## 2018-12-10 DIAGNOSIS — M75.102 NONTRAUMATIC TEAR OF LEFT ROTATOR CUFF: ICD-10-CM

## 2018-12-10 RX ORDER — PROMETHAZINE HYDROCHLORIDE 25 MG/1
25 TABLET ORAL EVERY 6 HOURS PRN
Qty: 40 TABLET | Refills: 0 | Status: SHIPPED | OUTPATIENT
Start: 2018-12-10 | End: 2019-02-07 | Stop reason: SDUPTHER

## 2018-12-10 RX ORDER — PROMETHAZINE HYDROCHLORIDE 25 MG/1
25 TABLET ORAL EVERY 6 HOURS PRN
Qty: 40 TABLET | Refills: 0 | Status: CANCELLED | OUTPATIENT
Start: 2018-12-10

## 2018-12-11 RX ORDER — HYDROCODONE BITARTRATE AND ACETAMINOPHEN 10; 325 MG/1; MG/1
1 TABLET ORAL EVERY 4 HOURS PRN
Qty: 28 TABLET | Refills: 0 | Status: SHIPPED | OUTPATIENT
Start: 2018-12-11 | End: 2018-12-21 | Stop reason: SDUPTHER

## 2018-12-12 ENCOUNTER — CLINICAL SUPPORT (OUTPATIENT)
Dept: REHABILITATION | Facility: HOSPITAL | Age: 43
End: 2018-12-12

## 2018-12-12 DIAGNOSIS — M75.102 NONTRAUMATIC TEAR OF LEFT ROTATOR CUFF: ICD-10-CM

## 2018-12-12 DIAGNOSIS — M25.512 ACUTE POSTOPERATIVE PAIN OF LEFT SHOULDER: Primary | ICD-10-CM

## 2018-12-12 DIAGNOSIS — G89.18 ACUTE POSTOPERATIVE PAIN OF LEFT SHOULDER: Primary | ICD-10-CM

## 2018-12-12 PROCEDURE — 97110 THERAPEUTIC EXERCISES: CPT

## 2018-12-12 PROCEDURE — 97014 ELECTRIC STIMULATION THERAPY: CPT

## 2018-12-12 NOTE — PROGRESS NOTES
"PHYSICAL THERAPY OUTPATIENT VISIT       Referring Provider:  Dr Shaji Galloway     Diagnosis:       ICD-10-CM ICD-9-CM    1. Acute postoperative pain of left shoulder G89.18 719.41     M25.512 338.18    2. Nontraumatic tear of left rotator cuff M75.102 727.61         Orders:  Evaluate and Treat   Date of Initial Evaluation: 12/12/2018      Visit # 4        SUBJECTIVE:  States he has been doing HEP regularly . Shoulder pain slowly decreasing -feels that it is moving with increased ease, He still gets soreness - especially lateral upper arm.     HISTORY:  Patient reports he possibly had a stroke in June and around the same time he developed left shoulder pain. Pain was worse with everything including moving it and getting dressed. He wasn't sleeping at night due to shoulder pain. Tried medicine with no relief. He went to [facility] and was given a shoulder injection 1 month ago that did not help. At [facility] in  he was told that he is not a surgical candidate because "he can't move his arm." He cannot attend PT due to financial reasons. 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 He then came to [facility] in October , ended up seeing Dr Shaji Galloway and decision made to do surgery .      Surgery 11-12-18 :   1. Left shoulder arthroscopic rotator cuff repair supraspinatus   2. Placement of a left shoulder collagen scaffold allograft augmentation on posterosuperior rotator cuff repair  3. Left shoulder arthroscopic debridement labrum  4. left shoulder arthroscopic distal clavicle excision   5. left  shoulder arthroscopic subacromial decompression.     Has been doing fairly well since the surgery. Had a reaction to meds at first but corrected now. Is using cold and estim at home frequently. Has been doing wrist exercises and shoulder shrugs as well as fingers. Pain about shoulder as expected but he has been managing with little to no meds in past few days.      Current Pain: 8 /10        "   Worst pain: 9-10 /10  Best pain:  6 /10    Function: Patient reports 93% disability based on score of the Upper Extremity Functional Scale.    OBJECTIVE:    Observation /  Posture :  Pt wearing sling in abduction pillow , TENS pads in place about GH joint . Mild swelling in forearm / hand . No redness about incision sites - appear to be healing well.      Shoulder PROM:   Left   Flexion 110   Extension ---   Abduction ---   Internal Rotation ----   External Rotation 25         Other ROM:   Elbow: flexion = full     extension = -5 active / WFL passive   Pronation= Full   Supination: mild loss   Wrist: WFL all directions   Hand: WFL all      Palpation:  Mild to moderate tenderness about GH joint .       TREATMENT PROVIDED:  -Manual Therapy -----  -Therapeutic Exercise:  PROM for flexion  IR, ER , AAROM for elbow all directions , and AROM for wrist . 5# wrist curls and extension, as well as radial deviation, scapular circles x 30  ( 35 mins )   -Modalities: MH and stim to shoulder x 15 mins ,   -Education on HEP, condition and posture, activity modification. Discussed POC and protocol. Answered questions .        ASSESSMENT:  The patient is a 43 y.o. year old male who presents to physical therapy s/p rotator cuff repair to left shoulder. Instructions to follow protocol for large repair. Patient's impairments include loss of ROM and strength as would be expected at this stage post operatively.   Notably increased ease of movement today . Good effort with all . Shoulder progressing well on large / massive RTC protocol.                                         c                                        Short Term Goals:  1-4 weeks  1. Pt will report a subjective decrease in pain / symptoms    Pay/  2. Pt will be instructed in and independent in basic home exercises / self care      Long Term Goals: 8-12 weeks  1. PROM to full all directions   2. 4/5 MMT for shoulder motions at 12 weeks   3. Full active ROM by 16 weeks post  operatively   4. Minimal to no pain with all ADLs    PLAN:  Patient will benefit from physical therapy (1-2) x/week for (16) weeks including manual therapy, therapeutic exercise, functional activities, modalities, and patient education.    Thank you for this referral.      Leonides Laboy, PT, OCS, FAAOMPT

## 2018-12-18 ENCOUNTER — OFFICE VISIT (OUTPATIENT)
Dept: SPORTS MEDICINE | Facility: CLINIC | Age: 43
End: 2018-12-18

## 2018-12-18 VITALS
BODY MASS INDEX: 36.44 KG/M2 | HEIGHT: 72 IN | SYSTOLIC BLOOD PRESSURE: 152 MMHG | HEART RATE: 94 BPM | WEIGHT: 269 LBS | DIASTOLIC BLOOD PRESSURE: 95 MMHG

## 2018-12-18 DIAGNOSIS — M75.102 NONTRAUMATIC TEAR OF LEFT ROTATOR CUFF: Primary | ICD-10-CM

## 2018-12-18 PROCEDURE — 99213 OFFICE O/P EST LOW 20 MIN: CPT | Mod: PBBFAC,PO | Performed by: ORTHOPAEDIC SURGERY

## 2018-12-18 PROCEDURE — 99999 PR PBB SHADOW E&M-EST. PATIENT-LVL III: CPT | Mod: PBBFAC,,, | Performed by: ORTHOPAEDIC SURGERY

## 2018-12-18 PROCEDURE — 99024 POSTOP FOLLOW-UP VISIT: CPT | Mod: ,,, | Performed by: ORTHOPAEDIC SURGERY

## 2018-12-18 NOTE — PROGRESS NOTES
CC: Left shoulder post op 6 weeks    DATE OF SURGERY:11/12/2018        PREOPERATIVE DIAGNOSES:   1. Right shoulder rotator cuff tear   2. Right shoulder labral tear  3. Right shoulder AC joint arthritis     POSTOPERATIVE DIAGNOSES:   1. Right shoulder rotator cuff tear   2. Right shoulder labral tear  3. Right shoulder AC joint arthritis     PROCEDURE:   1. Right shoulder arthroscopic rotator cuff repair supraspinatus   2. Placement of a right shoulder collagen scaffold allograft augmentation on posterosuperior rotator cuff repair  3. Right shoulder arthroscopic debridement labrum  4. Right shoulder arthroscopic distal clavicle excision   5. Right shoulder arthroscopic subacromial decompression.      SURGEON: Shaji Galloway M.D.     Adjustment to pain meds at last apt helping. Pain improving, only requiring pain meds twice daily. Working with PT closer to home.     Review of Systems   Constitution: Negative. Negative for chills, fever and night sweats.    Cardiovascular: Negative for chest pain and syncope.   Respiratory: Negative for cough and shortness of breath.   Gastrointestinal: Negative for abdominal pain and bowel incontinence.     PE:  Vitals:    12/18/18 1409   BP: (!) 152/95   Pulse: 94   Weight: 122 kg (269 lb)   Height: 6' (1.829 m)   PainSc:   5     Left shoulder  Incision healed  No sign of infection  Swelling resolved  Compartments soft  Neurovascular status intact in extremity    PROM  FF 90 degrees  5 degrees ER    Assessment:  Appropriate rotator cuff surgery recovery at 6 weeks    Plan:  1. Continue PT   2. Follow up 6 weeks  3. OK to return to work with limited duty

## 2018-12-19 ENCOUNTER — CLINICAL SUPPORT (OUTPATIENT)
Dept: REHABILITATION | Facility: HOSPITAL | Age: 43
End: 2018-12-19

## 2018-12-19 DIAGNOSIS — G89.18 ACUTE POSTOPERATIVE PAIN OF LEFT SHOULDER: Primary | ICD-10-CM

## 2018-12-19 DIAGNOSIS — M25.512 ACUTE POSTOPERATIVE PAIN OF LEFT SHOULDER: Primary | ICD-10-CM

## 2018-12-19 DIAGNOSIS — M75.102 NONTRAUMATIC TEAR OF LEFT ROTATOR CUFF: ICD-10-CM

## 2018-12-19 PROCEDURE — 97014 ELECTRIC STIMULATION THERAPY: CPT

## 2018-12-19 PROCEDURE — 97110 THERAPEUTIC EXERCISES: CPT

## 2018-12-21 DIAGNOSIS — Z98.890 S/P SHOULDER SURGERY: ICD-10-CM

## 2018-12-21 RX ORDER — HYDROCODONE BITARTRATE AND ACETAMINOPHEN 10; 325 MG/1; MG/1
1 TABLET ORAL EVERY 6 HOURS PRN
Qty: 28 TABLET | Refills: 0 | Status: SHIPPED | OUTPATIENT
Start: 2018-12-21 | End: 2019-01-08 | Stop reason: SDUPTHER

## 2018-12-21 NOTE — PROGRESS NOTES
"PHYSICAL THERAPY OUTPATIENT VISIT       Referring Provider:  Dr Shaji Galloway     Diagnosis:       ICD-10-CM ICD-9-CM    1. Acute postoperative pain of left shoulder G89.18 719.41     M25.512 338.18    2. Nontraumatic tear of left rotator cuff M75.102 727.61         Orders:  Evaluate and Treat   Date of Initial Evaluation: 12/21/2018      Visit # 5    SUBJECTIVE:  States he has been doing HEP regularly . Feels that he is doing well. Improved ease of movement. Saw MD and they were pleased with his status. He is now out of the abduction pillow and just in regular sling.     HISTORY:  Patient reports he possibly had a stroke in June and around the same time he developed left shoulder pain. Pain was worse with everything including moving it and getting dressed. He wasn't sleeping at night due to shoulder pain. Tried medicine with no relief. He went to [facility] and was given a shoulder injection 1 month ago that did not help. At [facility] in  he was told that he is not a surgical candidate because "he can't move his arm." He cannot attend PT due to financial reasons. 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 He then came to [facility] in October , ended up seeing Dr Shaji Galloway and decision made to do surgery .      Surgery 11-12-18 :   1. Left shoulder arthroscopic rotator cuff repair supraspinatus   2. Placement of a left shoulder collagen scaffold allograft augmentation on posterosuperior rotator cuff repair  3. Left shoulder arthroscopic debridement labrum  4. left shoulder arthroscopic distal clavicle excision   5. left  shoulder arthroscopic subacromial decompression.     Has been doing fairly well since the surgery. Had a reaction to meds at first but corrected now. Is using cold and estim at home frequently. Has been doing wrist exercises and shoulder shrugs as well as fingers. Pain about shoulder as expected but he has been managing with little to no meds in past few days.  "     Current Pain: 8 /10          Worst pain: 9-10 /10  Best pain:  6 /10    Function: Patient reports 93% disability based on score of the Upper Extremity Functional Scale.    OBJECTIVE:    Observation /  Posture :  Pt wearing sling , TENS pads in place about GH joint . Mild swelling in forearm / hand . No redness about incision sites - appear to be healing well.      Shoulder PROM:   Left   Flexion 110   Extension ---   Abduction ---   Internal Rotation ----   External Rotation 25         Other ROM:   Elbow: flexion = full     extension = -5 active / WFL passive   Pronation= Full   Supination: mild loss   Wrist: WFL all directions   Hand: WFL all      Palpation:  Mild to moderate tenderness about GH joint .       TREATMENT PROVIDED:  -Manual Therapy -----  -Therapeutic Exercise:  PROM for flexion  IR, ER , AAROM for elbow all directions , and AROM for wrist . 5# wrist curls and extension, as well as radial deviation, scapular circles x 30  ( 35 mins )   -Modalities: MH and stim to shoulder x 15 mins ,   -Education on HEP, condition and posture, activity modification. Discussed POC and protocol. Answered questions .        ASSESSMENT:  The patient is a 43 y.o. year old male who presents to physical therapy s/p rotator cuff repair to left shoulder. Instructions to follow protocol for large repair. Patient's impairments include loss of ROM and strength as would be expected at this stage post operatively.   Notably increased ease of movement today .ROM is excellent at this stage with minimal to no pain on PROM  Good effort with all . Shoulder progressing well on large / massive RTC protocol.                                         c                                        Short Term Goals:  1-4 weeks  1. Pt will report a subjective decrease in pain / symptoms    2. Pt will be instructed in and independent in basic home exercises / self care      Long Term Goals: 8-12 weeks  1. PROM to full all directions   2. 4/5 MMT  for shoulder motions at 12 weeks   3. Full active ROM by 16 weeks post operatively   4. Minimal to no pain with all ADLs    PLAN:  Patient will benefit from physical therapy (1-2) x/week for (16) weeks including manual therapy, therapeutic exercise, functional activities, modalities, and patient education.    Thank you for this referral.      Leonides Laboy, PT, OCS, FAAOMPT

## 2018-12-26 ENCOUNTER — CLINICAL SUPPORT (OUTPATIENT)
Dept: REHABILITATION | Facility: HOSPITAL | Age: 43
End: 2018-12-26

## 2018-12-26 DIAGNOSIS — G89.18 ACUTE POSTOPERATIVE PAIN OF LEFT SHOULDER: Primary | ICD-10-CM

## 2018-12-26 DIAGNOSIS — M25.512 ACUTE POSTOPERATIVE PAIN OF LEFT SHOULDER: Primary | ICD-10-CM

## 2018-12-26 PROCEDURE — 97014 ELECTRIC STIMULATION THERAPY: CPT

## 2018-12-26 PROCEDURE — 97110 THERAPEUTIC EXERCISES: CPT

## 2018-12-26 NOTE — PROGRESS NOTES
"PHYSICAL THERAPY OUTPATIENT VISIT       Referring Provider:  Dr Shaji Galloway     Diagnosis:       ICD-10-CM ICD-9-CM    1. Acute postoperative pain of left shoulder G89.18 719.41     M25.512 338.18         Orders:  Evaluate and Treat   Date of Initial Evaluation: 12/26/2018      Visit # 6    SUBJECTIVE:  States he has been doing HEP regularly and wearing his sling.    HISTORY:  Patient reports he possibly had a stroke in June and around the same time he developed left shoulder pain. Pain was worse with everything including moving it and getting dressed. He wasn't sleeping at night due to shoulder pain. Tried medicine with no relief. He went to [facility] and was given a shoulder injection 1 month ago that did not help. At [facility] in  he was told that he is not a surgical candidate because "he can't move his arm." He cannot attend PT due to financial reasons. 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 He then came to [facility] in October , ended up seeing Dr Shaji Galloway and decision made to do surgery .      Surgery 11-12-18 :   1. Left shoulder arthroscopic rotator cuff repair supraspinatus   2. Placement of a left shoulder collagen scaffold allograft augmentation on posterosuperior rotator cuff repair  3. Left shoulder arthroscopic debridement labrum  4. left shoulder arthroscopic distal clavicle excision   5. left  shoulder arthroscopic subacromial decompression.     Has been doing fairly well since the surgery. Had a reaction to meds at first but corrected now. Is using cold and estim at home frequently. Has been doing wrist exercises and shoulder shrugs as well as fingers. Pain about shoulder as expected but he has been managing with little to no meds in past few days.      Current Pain: 8 /10          Worst pain: 9-10 /10  Best pain:  6 /10    Function: Patient reports 93% disability based on score of the Upper Extremity Functional Scale.    OBJECTIVE:    Observation /  Posture : "  Pt wearing sling , TENS pads in place about GH joint . Mild swelling in forearm / hand . No redness about incision sites - appear to be healing well.      Shoulder PROM:   Left   Flexion 110   Extension ---   Abduction ---   Internal Rotation ----   External Rotation 25         Other ROM:   Elbow: flexion = full     extension = -5 active / WFL passive   Pronation= Full   Supination: mild loss   Wrist: WFL all directions   Hand: WFL all      Palpation:  Mild to moderate tenderness about GH joint .       TREATMENT PROVIDED:  -Manual Therapy -----  -Therapeutic Exercise:  PROM for flexion, Abduction, IR, ER , AROM for elbow , and AROM for wrist . 5# wrist curls and extension, as well as radial deviation, scapular circles x 30  ( 35 mins )   -Modalities: MH and stim to shoulder x 15 mins ,   -Education on HEP, condition and posture, activity modification.       ASSESSMENT:  The patient is a 43 y.o. year old male who presents to physical therapy s/p rotator cuff repair to left shoulder. Instructions to follow protocol for large repair. Patient's impairments include loss of ROM and strength as would be expected at this stage post operatively.   Notably increased ease of movement today .ROM is excellent at this stage with minimal to no pain on PROM  Good effort with all . Shoulder progressing well on large / massive RTC protocol.                                                                               Short Term Goals:  1-4 weeks  1. Pt will report a subjective decrease in pain / symptoms    2. Pt will be instructed in and independent in basic home exercises / self care      Long Term Goals: 8-12 weeks  1. PROM to full all directions   2. 4/5 MMT for shoulder motions at 12 weeks   3. Full active ROM by 16 weeks post operatively   4. Minimal to no pain with all ADLs    PLAN:  Patient will benefit from physical therapy (1-2) x/week for (16) weeks including manual therapy, therapeutic exercise, functional activities,  modalities, and patient education.    Thank you for this referral.      Odilai Davila, PT

## 2019-01-08 DIAGNOSIS — Z98.890 S/P SHOULDER SURGERY: ICD-10-CM

## 2019-01-08 RX ORDER — HYDROCODONE BITARTRATE AND ACETAMINOPHEN 10; 325 MG/1; MG/1
1 TABLET ORAL EVERY 6 HOURS PRN
Qty: 28 TABLET | Refills: 0 | Status: SHIPPED | OUTPATIENT
Start: 2019-01-08 | End: 2019-01-28 | Stop reason: SDUPTHER

## 2019-01-10 ENCOUNTER — CLINICAL SUPPORT (OUTPATIENT)
Dept: REHABILITATION | Facility: HOSPITAL | Age: 44
End: 2019-01-10
Payer: COMMERCIAL

## 2019-01-10 DIAGNOSIS — M75.102 NONTRAUMATIC TEAR OF LEFT ROTATOR CUFF: Primary | ICD-10-CM

## 2019-01-10 PROCEDURE — 97110 THERAPEUTIC EXERCISES: CPT

## 2019-01-10 PROCEDURE — 97014 ELECTRIC STIMULATION THERAPY: CPT

## 2019-01-10 NOTE — PROGRESS NOTES
"PHYSICAL THERAPY OUTPATIENT VISIT       Referring Provider:  Dr Shaji Galloway     Diagnosis:       ICD-10-CM ICD-9-CM    1. Nontraumatic tear of left rotator cuff M75.102 727.61         Orders:  Evaluate and Treat   Date of Initial Evaluation: 01/10/2019      Visit # 7    SUBJECTIVE:  States he has been doing HEP regularly and he is doing well. He is now over 8 weeks post operatively and out of sling. Eager to progress.     HISTORY:  Patient reports he possibly had a stroke in June and around the same time he developed left shoulder pain. Pain was worse with everything including moving it and getting dressed. He wasn't sleeping at night due to shoulder pain. Tried medicine with no relief. He went to [facility] and was given a shoulder injection 1 month ago that did not help. At [facility] in  he was told that he is not a surgical candidate because "he can't move his arm." He cannot attend PT due to financial reasons. 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 He then came to [facility] in October , ended up seeing Dr Shaji Galloway and decision made to do surgery .      Surgery 11-12-18 :   1. Left shoulder arthroscopic rotator cuff repair supraspinatus   2. Placement of a left shoulder collagen scaffold allograft augmentation on posterosuperior rotator cuff repair  3. Left shoulder arthroscopic debridement labrum  4. left shoulder arthroscopic distal clavicle excision   5. left  shoulder arthroscopic subacromial decompression.     Has been doing fairly well since the surgery. Had a reaction to meds at first but corrected now. Is using cold and estim at home frequently. Has been doing wrist exercises and shoulder shrugs as well as fingers. Pain about shoulder as expected but he has been managing with little to no meds in past few days.      Current Pain: 8 /10          Worst pain: 9-10 /10  Best pain:  6 /10    Function: Patient reports 55% disability based on score of the Upper Extremity " Functional Scale. ( 93% at eval)     OBJECTIVE:    Observation /  Posture :  Pt wearing sling , TENS pads in place about GH joint . Mild swelling in forearm / hand . No redness about incision sites - appear to be healing well.      Shoulder PROM:   Left   Flexion 170   Extension WFL   Abduction 100   Internal Rotation WFL   External Rotation 40         Other ROM:   Elbow: flexion = full     extension = -5 active / WFL passive   Pronation= Full   Supination: mild loss   Wrist: WFL all directions   Hand: WFL all      Palpation:  Mild to moderate tenderness about GH joint .       TREATMENT PROVIDED:  -Manual Therapy -----  -Therapeutic Exercise:  PROM for flexion, Abduction, IR, ER , AROM for elbow / wrist and AAROM for shoulder flexion,  . 5# wrist curls and extension, as well as radial deviation, scapular circles x 30  , isometrics all directions for shoulder - with PT 3 x 10 all and then 2 x 10 each with towel roll on wall. , supine protraction / punches 3 x 10 ( 40 mins )   -Modalities: MH and stim to shoulder x 15 mins ,   -Education on HEP, condition and posture, activity modification.       ASSESSMENT:  The patient is a 43 y.o. year old male who presents to physical therapy s/p rotator cuff repair to left shoulder. Instructions to follow protocol for large repair. Patient's impairments include loss of ROM and strength as would be expected at this stage post operatively.   Continues to progress well on large / massive RTC protocol. Initiated AAROM without difficulty and pt has excellent PROM at this point. Began isometrics all directions without pain and instructed in doing isometrics at home as well.                                                                               Short Term Goals:  1-4 weeks  1. Pt will report a subjective decrease in pain / symptoms     MET   2. Pt will be instructed in and independent in basic home exercises / self care MET      Long Term Goals: 8-12 weeks  1. PROM to full all  directions   2. 4/5 MMT for shoulder motions at 12 weeks   3. Full active ROM by 16 weeks post operatively   4. Minimal to no pain with all ADLs    PLAN:  Patient will benefit from physical therapy (1-2) x/week for (16) weeks including manual therapy, therapeutic exercise, functional activities, modalities, and patient education.    Thank you for this referral.      Leonides Laboy, PT, OCS FAAOMPT

## 2019-01-16 ENCOUNTER — CLINICAL SUPPORT (OUTPATIENT)
Dept: REHABILITATION | Facility: HOSPITAL | Age: 44
End: 2019-01-16
Payer: COMMERCIAL

## 2019-01-16 DIAGNOSIS — M75.102 NONTRAUMATIC TEAR OF LEFT ROTATOR CUFF: Primary | ICD-10-CM

## 2019-01-16 DIAGNOSIS — M25.512 ACUTE POSTOPERATIVE PAIN OF LEFT SHOULDER: ICD-10-CM

## 2019-01-16 DIAGNOSIS — G89.18 ACUTE POSTOPERATIVE PAIN OF LEFT SHOULDER: ICD-10-CM

## 2019-01-16 PROCEDURE — 97014 ELECTRIC STIMULATION THERAPY: CPT

## 2019-01-16 PROCEDURE — 97150 GROUP THERAPEUTIC PROCEDURES: CPT

## 2019-01-16 NOTE — PROGRESS NOTES
"PHYSICAL THERAPY OUTPATIENT VISIT       Referring Provider:  Dr Shaji Galloway     Diagnosis:       ICD-10-CM ICD-9-CM    1. Nontraumatic tear of left rotator cuff M75.102 727.61    2. Acute postoperative pain of left shoulder G89.18 719.41     M25.512 338.18         Orders:  Evaluate and Treat   Date of Initial Evaluation: 01/16/2019      Visit # 8    SUBJECTIVE:  States he has been doing HEP regularly and he is doing well. Is moving with much more ease     HISTORY:  Patient reports he possibly had a stroke in June and around the same time he developed left shoulder pain. Pain was worse with everything including moving it and getting dressed. He wasn't sleeping at night due to shoulder pain. Tried medicine with no relief. He went to [facility] and was given a shoulder injection 1 month ago that did not help. At [facility] in  he was told that he is not a surgical candidate because "he can't move his arm." He cannot attend PT due to financial reasons. 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 He then came to [facility] in October , ended up seeing Dr Shaji Galloway and decision made to do surgery .      Surgery 11-12-18 :   1. Left shoulder arthroscopic rotator cuff repair supraspinatus   2. Placement of a left shoulder collagen scaffold allograft augmentation on posterosuperior rotator cuff repair  3. Left shoulder arthroscopic debridement labrum  4. left shoulder arthroscopic distal clavicle excision   5. left  shoulder arthroscopic subacromial decompression.     Has been doing fairly well since the surgery. Had a reaction to meds at first but corrected now. Is using cold and estim at home frequently. Has been doing wrist exercises and shoulder shrugs as well as fingers. Pain about shoulder as expected but he has been managing with little to no meds in past few days.      Current Pain: 8 /10          Worst pain: 9-10 /10  Best pain:  6 /10    Function: Patient reports 55% disability based " on score of the Upper Extremity Functional Scale. ( 93% at eval)     OBJECTIVE:    Observation /  Posture :  Pt wearing sling , TENS pads in place about GH joint . Mild swelling in forearm / hand . No redness about incision sites - appear to be healing well.      Shoulder PROM:   Left   Flexion 170   Extension WFL   Abduction 100   Internal Rotation WFL   External Rotation 40         Other ROM:   Elbow: flexion = full     extension = -5 active / WFL passive   Pronation= Full   Supination: mild loss   Wrist: WFL all directions   Hand: WFL all      Palpation:  Mild to moderate tenderness about GH joint .       TREATMENT PROVIDED:  -Manual Therapy -----grade 1 oscillatory mobs   -Therapeutic Exercise:  PROM for flexion, Abduction, IR, ER , AROM for elbow / wrist and AAROM for shoulder flexion,  . 5# wrist curls and extension, as well as radial deviation, scapular circles x 30  , isometrics all directions for shoulder - with PT 3 x 10 all and then 2 x 10 each with towel roll on wall. , supine protraction / punches 3 x 10 with 4# ( 40 mins )   -Modalities: MH and stim to shoulder x 15 mins ,   -Education on HEP, condition and posture, activity modification.       ASSESSMENT:  The patient is a 43 y.o. year old male who presents to physical therapy s/p rotator cuff repair to left shoulder. Instructions to follow protocol for large repair. Patient's impairments include loss of ROM and strength as would be expected at this stage post operatively.   Continues to progress very well. Much improved mobility and ROM with minimal to no pain on exercise.                                                                            Short Term Goals:  1-4 weeks  1. Pt will report a subjective decrease in pain / symptoms     MET   2. Pt will be instructed in and independent in basic home exercises / self care MET      Long Term Goals: 8-12 weeks  1. PROM to full all directions   2. 4/5 MMT for shoulder motions at 12 weeks   3. Full  active ROM by 16 weeks post operatively   4. Minimal to no pain with all ADLs    PLAN:  Patient will benefit from physical therapy (1-2) x/week for (16) weeks including manual therapy, therapeutic exercise, functional activities, modalities, and patient education.    Thank you for this referral.      Leonides Laboy, PT, OCS FAAOMPT

## 2019-01-22 ENCOUNTER — HISTORICAL (OUTPATIENT)
Dept: RADIOLOGY | Facility: HOSPITAL | Age: 44
End: 2019-01-22

## 2019-01-24 ENCOUNTER — CLINICAL SUPPORT (OUTPATIENT)
Dept: REHABILITATION | Facility: HOSPITAL | Age: 44
End: 2019-01-24
Payer: COMMERCIAL

## 2019-01-24 DIAGNOSIS — M75.102 NONTRAUMATIC TEAR OF LEFT ROTATOR CUFF: Primary | ICD-10-CM

## 2019-01-24 DIAGNOSIS — G89.18 ACUTE POSTOPERATIVE PAIN OF LEFT SHOULDER: ICD-10-CM

## 2019-01-24 DIAGNOSIS — M25.512 ACUTE POSTOPERATIVE PAIN OF LEFT SHOULDER: ICD-10-CM

## 2019-01-24 PROCEDURE — 97014 ELECTRIC STIMULATION THERAPY: CPT

## 2019-01-24 PROCEDURE — 97110 THERAPEUTIC EXERCISES: CPT

## 2019-01-24 NOTE — PROGRESS NOTES
"PHYSICAL THERAPY OUTPATIENT VISIT       Referring Provider:  Dr Shaji Galloway     Diagnosis:       ICD-10-CM ICD-9-CM    1. Nontraumatic tear of left rotator cuff M75.102 727.61    2. Acute postoperative pain of left shoulder G89.18 719.41     M25.512 338.18         Orders:  Evaluate and Treat   Date of Initial Evaluation: 01/24/2019      Visit # 8    SUBJECTIVE:  Is now 10 weeks post op. Reports that he is doing well. Still a little soreness but generally good- notably weak. Doing HEP regularly and has no increased pain.     HISTORY:  Patient reports he possibly had a stroke in June and around the same time he developed left shoulder pain. Pain was worse with everything including moving it and getting dressed. He wasn't sleeping at night due to shoulder pain. Tried medicine with no relief. He went to [facility] and was given a shoulder injection 1 month ago that did not help. At [facility] in  he was told that he is not a surgical candidate because "he can't move his arm." He cannot attend PT due to financial reasons. 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 He then came to [facility] in October , ended up seeing Dr Shaji Galloway and decision made to do surgery .      Surgery 11-12-18 :   1. Left shoulder arthroscopic rotator cuff repair supraspinatus   2. Placement of a left shoulder collagen scaffold allograft augmentation on posterosuperior rotator cuff repair  3. Left shoulder arthroscopic debridement labrum  4. left shoulder arthroscopic distal clavicle excision   5. left  shoulder arthroscopic subacromial decompression.     Has been doing fairly well since the surgery. Had a reaction to meds at first but corrected now. Is using cold and estim at home frequently. Has been doing wrist exercises and shoulder shrugs as well as fingers. Pain about shoulder as expected but he has been managing with little to no meds in past few days.      Current Pain: 8 /10          Worst pain: 9-10 " /10  Best pain:  6 /10    Function: Patient reports 55% disability based on score of the Upper Extremity Functional Scale. ( 93% at eval)     OBJECTIVE:    Observation /  Posture :  Minimal swelling in forearm / hand . No redness about incision sites -      Shoulder PROM:   Left   Flexion 170   Extension WFL   Abduction 100   Internal Rotation WFL   External Rotation 40         Other ROM:   Elbow: flexion = full     extension = -5 active / WFL passive   Pronation= Full   Supination: mild loss   Wrist: WFL all directions   Hand: WFL all      Palpation:  Mild to moderate tenderness about GH joint .       TREATMENT PROVIDED:  -Manual Therapy -----  -Therapeutic Exercise:  UBE 3 / 3 , seated AAROM on pulleys 3 x 2 mins , PROM for flexion, Abduction, IR, ER , AROM for elbow / wrist and AAROM for shoulder flexion,  .  scapular circles x 30  , isometrics all directions for shoulder - with PT 3 x 10 all  supine protraction / punches 3 x 10 with 4#, scapular shrugs 3 x 15 with 5# , scapular depression 3 x 10 20# pulleys , scapular retraction 3 x 15 20# at pulleys ( 40 mins )   -Modalities: MH and stim to shoulder x 15 mins ,   -Education on HEP, condition and posture, activity modification.       ASSESSMENT:  The patient is a 43 y.o. year old male who presents to physical therapy s/p rotator cuff repair to left shoulder. Instructions to follow protocol for large repair. Patient's impairments include loss of ROM and strength as would be expected at this stage post operatively.   Continues to progress well towards all goals set . Is now 10 weeks post op and meeting goals for this stage. He will be going to [facility] for checkup next week and will return afterwards. He may benefit from increasing to 2x/ week when he gets to 12 weeks post op and exercises increase.                                                        Short Term Goals:  1-4 weeks  1. Pt will report a subjective decrease in pain / symptoms     MET   2. Pt will be  instructed in and independent in basic home exercises / self care MET      Long Term Goals: 8-12 weeks  1. PROM to full all directions   2. 4/5 MMT for shoulder motions at 12 weeks   3. Full active ROM by 16 weeks post operatively   4. Minimal to no pain with all ADLs    PLAN:  Patient will benefit from physical therapy (1-2) x/week for (16) weeks including manual therapy, therapeutic exercise, functional activities, modalities, and patient education.    Thank you for this referral.      Leonides Laboy, PT, OCS FAAOMPT

## 2019-01-28 DIAGNOSIS — Z98.890 S/P SHOULDER SURGERY: ICD-10-CM

## 2019-01-28 RX ORDER — HYDROCODONE BITARTRATE AND ACETAMINOPHEN 10; 325 MG/1; MG/1
1 TABLET ORAL EVERY 6 HOURS PRN
Qty: 28 TABLET | Refills: 0 | Status: SHIPPED | OUTPATIENT
Start: 2019-01-28 | End: 2019-02-07

## 2019-01-29 ENCOUNTER — OFFICE VISIT (OUTPATIENT)
Dept: SPORTS MEDICINE | Facility: CLINIC | Age: 44
End: 2019-01-29
Payer: COMMERCIAL

## 2019-01-29 VITALS
HEART RATE: 86 BPM | DIASTOLIC BLOOD PRESSURE: 79 MMHG | SYSTOLIC BLOOD PRESSURE: 128 MMHG | HEIGHT: 72 IN | WEIGHT: 238 LBS | BODY MASS INDEX: 32.23 KG/M2

## 2019-01-29 DIAGNOSIS — Z98.890 S/P ROTATOR CUFF REPAIR: ICD-10-CM

## 2019-01-29 DIAGNOSIS — Z98.890 POST-OPERATIVE STATE: Primary | ICD-10-CM

## 2019-01-29 PROCEDURE — 99213 OFFICE O/P EST LOW 20 MIN: CPT | Mod: PBBFAC,PO | Performed by: ORTHOPAEDIC SURGERY

## 2019-01-29 PROCEDURE — 99999 PR PBB SHADOW E&M-EST. PATIENT-LVL III: ICD-10-PCS | Mod: PBBFAC,,, | Performed by: ORTHOPAEDIC SURGERY

## 2019-01-29 PROCEDURE — 99024 PR POST-OP FOLLOW-UP VISIT: ICD-10-PCS | Mod: S$GLB,,, | Performed by: ORTHOPAEDIC SURGERY

## 2019-01-29 PROCEDURE — 99024 POSTOP FOLLOW-UP VISIT: CPT | Mod: S$GLB,,, | Performed by: ORTHOPAEDIC SURGERY

## 2019-01-29 PROCEDURE — 99999 PR PBB SHADOW E&M-EST. PATIENT-LVL III: CPT | Mod: PBBFAC,,, | Performed by: ORTHOPAEDIC SURGERY

## 2019-01-29 RX ORDER — ESCITALOPRAM OXALATE 10 MG/1
10 TABLET ORAL DAILY
COMMUNITY

## 2019-01-29 NOTE — PROGRESS NOTES
CC: Left shoulder post op 3 months    DATE OF SURGERY:11/12/2018        PREOPERATIVE DIAGNOSES:   1. Right shoulder rotator cuff tear   2. Right shoulder labral tear  3. Right shoulder AC joint arthritis     POSTOPERATIVE DIAGNOSES:   1. Right shoulder rotator cuff tear   2. Right shoulder labral tear  3. Right shoulder AC joint arthritis     PROCEDURE:   1. Right shoulder arthroscopic rotator cuff repair supraspinatus   2. Placement of a right shoulder collagen scaffold allograft augmentation on posterosuperior rotator cuff repair  3. Right shoulder arthroscopic debridement labrum  4. Right shoulder arthroscopic distal clavicle excision   5. Right shoulder arthroscopic subacromial decompression.      SURGEON: Shaji Galloway M.D.     Adjustment to pain meds at last apt helping. Pain improving, only requiring pain meds twice daily. Working with PT at [facility] location.    Review of Systems   Constitution: Negative. Negative for chills, fever and night sweats.    Cardiovascular: Negative for chest pain and syncope.   Respiratory: Negative for cough and shortness of breath.   Gastrointestinal: Negative for abdominal pain and bowel incontinence.     PE:  Vitals:    01/29/19 1438   BP: 128/79   Pulse: 86   Weight: 108 kg (238 lb)   Height: 6' (1.829 m)   PainSc:   6     Left shoulder  Incision healed  No sign of infection  Swelling resolved  Compartments soft  Neurovascular status intact in extremity    PROM   degrees  35 degrees ER    Assessment:  Appropriate rotator cuff surgery recovery at 3 months    Plan:  1. Continue PT   2. Follow up 3 months  3. OK to return to work with limited duty

## 2019-01-31 ENCOUNTER — CLINICAL SUPPORT (OUTPATIENT)
Dept: REHABILITATION | Facility: HOSPITAL | Age: 44
End: 2019-01-31
Payer: COMMERCIAL

## 2019-01-31 DIAGNOSIS — G89.18 ACUTE POSTOPERATIVE PAIN OF LEFT SHOULDER: ICD-10-CM

## 2019-01-31 DIAGNOSIS — M25.512 ACUTE POSTOPERATIVE PAIN OF LEFT SHOULDER: ICD-10-CM

## 2019-01-31 DIAGNOSIS — M75.102 NONTRAUMATIC TEAR OF LEFT ROTATOR CUFF: Primary | ICD-10-CM

## 2019-01-31 PROCEDURE — 97110 THERAPEUTIC EXERCISES: CPT

## 2019-01-31 NOTE — PROGRESS NOTES
"PHYSICAL THERAPY OUTPATIENT VISIT       Referring Provider:  Dr Shaji Galloway     Diagnosis:       ICD-10-CM ICD-9-CM    1. Nontraumatic tear of left rotator cuff M75.102 727.61    2. Acute postoperative pain of left shoulder G89.18 719.41     M25.512 338.18         Orders:  Evaluate and Treat   Date of Initial Evaluation: 01/31/2019      Visit # 8    SUBJECTIVE:  Is now 11 1/2 weeks post op. Reports that he saw MD for follow up and went well. MD did say the only concern was persistent swelling about the upper arm / shoulder.     HISTORY:  Patient reports he possibly had a stroke in June and around the same time he developed left shoulder pain. Pain was worse with everything including moving it and getting dressed. He wasn't sleeping at night due to shoulder pain. Tried medicine with no relief. [facility] and was given a shoulder injection 1 month ago that did not help. At [facility] in  he was told that he is not a surgical candidate because "he can't move his arm." He cannot attend PT due to financial reasons. 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 He then came to [facility] in October , ended up seeing Dr Shaji Galloway and decision made to do surgery .      Surgery 11-12-18 :   1. Left shoulder arthroscopic rotator cuff repair supraspinatus   2. Placement of a left shoulder collagen scaffold allograft augmentation on posterosuperior rotator cuff repair  3. Left shoulder arthroscopic debridement labrum  4. left shoulder arthroscopic distal clavicle excision   5. left  shoulder arthroscopic subacromial decompression.     Has been doing fairly well since the surgery. Had a reaction to meds at first but corrected now. Is using cold and estim at home frequently. Has been doing wrist exercises and shoulder shrugs as well as fingers. Pain about shoulder as expected but he has been managing with little to no meds in past few days.      Current Pain: 8 /10          Worst pain: 9-10 " /10  Best pain:  6 /10    Function: Patient reports 55% disability based on score of the Upper Extremity Functional Scale. ( 93% at eval)     OBJECTIVE:    Observation /  Posture :  Minimal swelling in forearm / hand . No redness about incision sites -      Shoulder PROM:   Left   Flexion 170   Extension WFL   Abduction 100   Internal Rotation WFL   External Rotation 40         Other ROM:   Elbow: flexion = full     extension = -5 active / WFL passive   Pronation= Full   Supination: mild loss   Wrist: WFL all directions   Hand: WFL all      Palpation:  Mild to moderate tenderness about GH joint .       TREATMENT PROVIDED:  -Manual Therapy -----  -Therapeutic Exercise:  UBE 3 / 3 , seated AAROM on pulleys 3 x 2 mins , PROM for flexion, Abduction, IR, ER , AROM for elbow / wrist and AAROM for shoulder flexion,  .  scapular circles x 30  , isometrics all directions for shoulder - with PT 3 x 10 all  supine protraction / punches 3 x 10 with 4#, scapular shrugs 3 x 15 with 5# , scapular depression 3 x 10 20# pulleys , scapular retraction 3 x 15 20# at pulleys, weight shift leaning on wall x 30 , mini press ups on wall x 30  ( 50 mins )   -Modalities: MH and stim to shoulder x 15 mins ,   -Education on HEP, condition and posture, activity modification.       ASSESSMENT:  The patient is a 43 y.o. year old male who presents to physical therapy s/p rotator cuff repair to left shoulder. Instructions to follow protocol for large repair. Patient's impairments include loss of ROM and strength as would be expected at this stage post operatively.   Continues to progress well towards all goals set . Is now 11 weeks post op and meeting goals for this stage.  At 12 weeks will be able to progress significantly with exercise. He will benefit from increasing to 2x/ week as exercises increase to educate and re-establish home program.                                                        Short Term Goals:  1-4 weeks  1. Pt will report a  subjective decrease in pain / symptoms     MET   2. Pt will be instructed in and independent in basic home exercises / self care MET      Long Term Goals: 8-12 weeks  1. PROM to full all directions    ALMOST  MET   2. 4/5 MMT for shoulder motions at 12 weeks   3. Full active ROM by 16 weeks post operatively   4. Minimal to no pain with all ADLs    PLAN:  Patient will benefit from physical therapy (1-2) x/week for (16) weeks including manual therapy, therapeutic exercise, functional activities, modalities, and patient education.    Thank you for this referral.      Leonides Laboy, PT, OCS FAAOMPT

## 2019-02-01 ENCOUNTER — TELEPHONE (OUTPATIENT)
Dept: SPORTS MEDICINE | Facility: CLINIC | Age: 44
End: 2019-02-01

## 2019-02-01 NOTE — TELEPHONE ENCOUNTER
Received multiple forms to fill out for the patient. All forms completed and faxed to [phone] per the patients request.    Graciela Gee MA  [facility]

## 2019-02-04 ENCOUNTER — CLINICAL SUPPORT (OUTPATIENT)
Dept: REHABILITATION | Facility: HOSPITAL | Age: 44
End: 2019-02-04
Payer: COMMERCIAL

## 2019-02-04 DIAGNOSIS — M25.512 ACUTE POSTOPERATIVE PAIN OF LEFT SHOULDER: ICD-10-CM

## 2019-02-04 DIAGNOSIS — M75.102 NONTRAUMATIC TEAR OF LEFT ROTATOR CUFF: Primary | ICD-10-CM

## 2019-02-04 DIAGNOSIS — G89.18 ACUTE POSTOPERATIVE PAIN OF LEFT SHOULDER: ICD-10-CM

## 2019-02-04 PROCEDURE — 97140 MANUAL THERAPY 1/> REGIONS: CPT

## 2019-02-04 PROCEDURE — 97110 THERAPEUTIC EXERCISES: CPT

## 2019-02-04 NOTE — PROGRESS NOTES
"PHYSICAL THERAPY OUTPATIENT VISIT       Referring Provider:  Dr Shaji Galloway     Diagnosis:       ICD-10-CM ICD-9-CM    1. Nontraumatic tear of left rotator cuff M75.102 727.61    2. Acute postoperative pain of left shoulder G89.18 719.41     M25.512 338.18         Orders:  Evaluate and Treat   Date of Initial Evaluation: 02/04/2019      Visit # 9    SUBJECTIVE:  Is now 12 weeks post op. Patient reports soreness more than usual this weekend and tenderness today in the front of the shoulder. Patient states he did not perform any new exercises and "relaxed" over the weekend due to soreness. Currently his shoulder is stiff with some pain down the upper arm. Patient states he started taking Diclofenac Pot 50 mg last week to treat arthritis of hands.      HISTORY:  Patient reports he possibly had a stroke in June and around the same time he developed left shoulder pain. Pain was worse with everything including moving it and getting dressed. He wasn't sleeping at night due to shoulder pain. Tried medicine with no relief. He went to [facility] and was given a shoulder injection 1 month ago that did not help. At [facility] in  he was told that he is not a surgical candidate because "he can't move his arm." He cannot attend PT due to financial reasons. 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 He then came to [facility] in October , ended up seeing Dr Shaji Galloway and decision made to do surgery .      Surgery 11-12-18 :   1. Left shoulder arthroscopic rotator cuff repair supraspinatus   2. Placement of a left shoulder collagen scaffold allograft augmentation on posterosuperior rotator cuff repair  3. Left shoulder arthroscopic debridement labrum  4. left shoulder arthroscopic distal clavicle excision   5. left  shoulder arthroscopic subacromial decompression.     Has been doing fairly well since the surgery. Had a reaction to meds at first but corrected now. Is using cold and estim at home " frequently. Has been doing wrist exercises and shoulder shrugs as well as fingers. Pain about shoulder as expected but he has been managing with little to no meds in past few days.      Current Pain: 8 /10          Worst pain: 9-10 /10  Best pain:  6 /10    Function: Patient reports 55% disability based on score of the Upper Extremity Functional Scale. ( 93% at eval)     OBJECTIVE:    Observation /  Posture :  Minimal swelling in forearm / hand . No redness about incision sites -      Shoulder PROM:   Left   Flexion 170   Extension WFL   Abduction 100   Internal Rotation WFL   External Rotation 40         Other ROM:   Elbow: flexion = full     extension = -5 active / WFL passive   Pronation= Full   Supination: mild loss   Wrist: WFL all directions   Hand: WFL all      Palpation:  Mild to moderate tenderness about GH joint .       TREATMENT PROVIDED:  -Manual Therapy: PROM GH joint all motions; Anterior/posterior joint mobilizations; PNF D1/D2 with resistance; ER/IR with resistance (15 min)  -Therapeutic Exercise:  UBE 3 / 3 , seated AAROM on pulleys 3 x 2 mins , PROM for flexion, Abduction, IR, ER , AROM for elbow / wrist and AAROM for shoulder flexion,  .  scapular circles x 30  , isometrics all directions for shoulder - with PT 3 x 10 all  supine protraction / punches 3 x 10 with 4#, scapular shrugs 3 x 15 with 5# , scapular depression 3 x 10 20# pulleys , scapular retraction 3 x 15 20# at pulleys, weight shift leaning on wall x 30 , mini press ups on wall x 30  ( 50 mins )   -Modalities: MH and stim to shoulder x 15 mins ,   -Education on HEP, condition and posture, activity modification.       ASSESSMENT:  The patient is a 43 y.o. year old male who presents to physical therapy s/p rotator cuff repair to left shoulder. Instructions to follow protocol for large repair. Patient's impairments include loss of ROM and strength as would be expected at this stage post operatively.   Continues to progress well towards  all goals set . Is now 12 weeks post op and meeting goals for this stage.  Patient able to perform deltoid/cuff/serratus anterior strengthening without pain. PNF D1/D2 performed to promote reciprocal coordination of agonist and antagonist muscles: no pain noted. AROM abduction patient uses upper trap recruitment due to weakness of deltoid/cuff musculature.                              Short Term Goals:  1-4 weeks  1. Pt will report a subjective decrease in pain / symptoms     MET   2. Pt will be instructed in and independent in basic home exercises / self care MET      Long Term Goals: 8-12 weeks  1. PROM to full all directions    ALMOST  MET   2. 4/5 MMT for shoulder motions at 12 weeks   3. Full active ROM by 16 weeks post operatively   4. Minimal to no pain with all ADLs    PLAN:  Patient will benefit from physical therapy (1-2) x/week for (16) weeks including manual therapy, therapeutic exercise, functional activities, modalities, and patient education.    Thank you for this referral.      Saleem Shook, PT, DPT

## 2019-02-07 ENCOUNTER — CLINICAL SUPPORT (OUTPATIENT)
Dept: REHABILITATION | Facility: HOSPITAL | Age: 44
End: 2019-02-07
Payer: COMMERCIAL

## 2019-02-07 DIAGNOSIS — G89.18 ACUTE POSTOPERATIVE PAIN OF LEFT SHOULDER: ICD-10-CM

## 2019-02-07 DIAGNOSIS — M25.512 ACUTE POSTOPERATIVE PAIN OF LEFT SHOULDER: ICD-10-CM

## 2019-02-07 DIAGNOSIS — M19.012 ARTHRITIS OF LEFT ACROMIOCLAVICULAR JOINT: ICD-10-CM

## 2019-02-07 DIAGNOSIS — M75.22 BICEPS TENDINITIS OF LEFT UPPER EXTREMITY: ICD-10-CM

## 2019-02-07 DIAGNOSIS — M75.102 NONTRAUMATIC TEAR OF LEFT ROTATOR CUFF: Primary | ICD-10-CM

## 2019-02-07 DIAGNOSIS — M75.102 NONTRAUMATIC TEAR OF LEFT ROTATOR CUFF: ICD-10-CM

## 2019-02-07 DIAGNOSIS — Z98.890 S/P SHOULDER SURGERY: ICD-10-CM

## 2019-02-07 PROCEDURE — 97110 THERAPEUTIC EXERCISES: CPT

## 2019-02-07 PROCEDURE — 97014 ELECTRIC STIMULATION THERAPY: CPT

## 2019-02-07 PROCEDURE — 97140 MANUAL THERAPY 1/> REGIONS: CPT

## 2019-02-07 RX ORDER — HYDROCODONE BITARTRATE AND ACETAMINOPHEN 10; 325 MG/1; MG/1
1 TABLET ORAL EVERY 6 HOURS PRN
Qty: 28 TABLET | Refills: 0 | Status: CANCELLED | OUTPATIENT
Start: 2019-02-07

## 2019-02-07 NOTE — PROGRESS NOTES
"PHYSICAL THERAPY OUTPATIENT VISIT       Referring Provider:  Dr Shaji Galloway     Diagnosis:       ICD-10-CM ICD-9-CM    1. Nontraumatic tear of left rotator cuff M75.102 727.61    2. Acute postoperative pain of left shoulder G89.18 719.41     M25.512 338.18         Orders:  Evaluate and Treat   Date of Initial Evaluation: 02/07/2019      Visit # 12    SUBJECTIVE:  Reports that he has been very sore snce last session . Increased pain , not at the GH joint but lateral arm .      HISTORY:  Patient reports he possibly had a stroke in June and around the same time he developed left shoulder pain. Pain was worse with everything including moving it and getting dressed. He wasn't sleeping at night due to shoulder pain. Tried medicine with no relief. He went to [facility] and was given a shoulder injection 1 month ago that did not help. At [facility] in  he was told that he is not a surgical candidate because "he can't move his arm." He cannot attend PT due to financial reasons. 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 He then came to [facility] in October , ended up seeing Dr Shaji Galloway and decision made to do surgery .      Surgery 11-12-18 :   1. Left shoulder arthroscopic rotator cuff repair supraspinatus   2. Placement of a left shoulder collagen scaffold allograft augmentation on posterosuperior rotator cuff repair  3. Left shoulder arthroscopic debridement labrum  4. left shoulder arthroscopic distal clavicle excision   5. left  shoulder arthroscopic subacromial decompression.     Has been doing fairly well since the surgery. Had a reaction to meds at first but corrected now. Is using cold and estim at home frequently. Has been doing wrist exercises and shoulder shrugs as well as fingers. Pain about shoulder as expected but he has been managing with little to no meds in past few days.      Current Pain: 8 /10          Worst pain: 9-10 /10  Best pain:  6 /10    Function: Patient reports " 55% disability based on score of the Upper Extremity Functional Scale. ( 93% at eval)     OBJECTIVE:    Observation /  Posture :  Minimal swelling in forearm / hand . No redness about incision sites -      Shoulder PROM:   Left   Flexion 170   Extension WFL   Abduction 100   Internal Rotation WFL   External Rotation 40         Other ROM:   Elbow: flexion = full     extension = -5 active / WFL passive   Pronation= Full   Supination: mild loss   Wrist: WFL all directions   Hand: WFL all      Palpation:  Mild to moderate tenderness about GH joint .       TREATMENT PROVIDED:  -Manual Therapy: STM to anterior deltoid  (10 min)  -Therapeutic Exercise:  UBE 3 / 3 , seated AAROM on pulleys 3 x 2 mins , PROM for flexion, Abduction, IR, ER , AROM for elbow / wrist and AAROM for shoulder flexion,  .  scapular circles x 30  , isometrics all directions for shoulder - with PT 3 x 10 all  supine protraction / punches 3 x 10 with 4#, scapular shrugs 3 x 15 with 5# , scapular retraction 3 x 15 20# at pulleys,  ( 45 mins )   -Modalities: MH and stim to shoulder x 15 mins ,   -Education on HEP, condition and posture, activity modification.       ASSESSMENT:  The patient is a 43 y.o. year old male who presents to physical therapy s/p rotator cuff repair to left shoulder. Instructions to follow protocol for large repair. Patient's impairments at evaluation included loss of ROM and strength as would be expected post operatively.   Appears to have anterior deltoid strain . Treatment adjusted / modified today and will gradually increase / resume next week.                               Short Term Goals:  1-4 weeks  1. Pt will report a subjective decrease in pain / symptoms     MET   2. Pt will be instructed in and independent in basic home exercises / self care MET      Long Term Goals: 8-12 weeks  1. PROM to full all directions    ALMOST  MET   2. 4/5 MMT for shoulder motions at 12 weeks   3. Full active ROM by 16 weeks post operatively    4. Minimal to no pain with all ADLs    PLAN:  Patient will benefit from physical therapy (1-2) x/week for (16) weeks including manual therapy, therapeutic exercise, functional activities, modalities, and patient education.    Thank you for this referral.      Saleem Shook, PT, DPT

## 2019-02-08 RX ORDER — HYDROCODONE BITARTRATE AND ACETAMINOPHEN 5; 325 MG/1; MG/1
1 TABLET ORAL EVERY 8 HOURS PRN
Qty: 30 TABLET | Refills: 0 | Status: SHIPPED | OUTPATIENT
Start: 2019-02-08 | End: 2019-02-25 | Stop reason: SDUPTHER

## 2019-02-08 RX ORDER — PROMETHAZINE HYDROCHLORIDE 25 MG/1
25 TABLET ORAL EVERY 6 HOURS PRN
Qty: 30 TABLET | Refills: 0 | Status: SHIPPED | OUTPATIENT
Start: 2019-02-08

## 2019-02-08 RX ORDER — TRAMADOL HYDROCHLORIDE 50 MG/1
50 TABLET ORAL EVERY 8 HOURS PRN
Qty: 30 TABLET | Refills: 0 | Status: SHIPPED | OUTPATIENT
Start: 2019-02-08 | End: 2019-02-25 | Stop reason: SDUPTHER

## 2019-02-11 ENCOUNTER — CLINICAL SUPPORT (OUTPATIENT)
Dept: REHABILITATION | Facility: HOSPITAL | Age: 44
End: 2019-02-11
Payer: COMMERCIAL

## 2019-02-11 DIAGNOSIS — M75.102 NONTRAUMATIC TEAR OF LEFT ROTATOR CUFF: Primary | ICD-10-CM

## 2019-02-11 DIAGNOSIS — G89.18 ACUTE POSTOPERATIVE PAIN OF LEFT SHOULDER: ICD-10-CM

## 2019-02-11 DIAGNOSIS — M25.512 ACUTE POSTOPERATIVE PAIN OF LEFT SHOULDER: ICD-10-CM

## 2019-02-11 PROCEDURE — 97140 MANUAL THERAPY 1/> REGIONS: CPT

## 2019-02-11 PROCEDURE — 97110 THERAPEUTIC EXERCISES: CPT

## 2019-02-11 PROCEDURE — 97014 ELECTRIC STIMULATION THERAPY: CPT

## 2019-02-12 ENCOUNTER — PATIENT MESSAGE (OUTPATIENT)
Dept: SPORTS MEDICINE | Facility: CLINIC | Age: 44
End: 2019-02-12

## 2019-02-12 NOTE — PROGRESS NOTES
"PHYSICAL THERAPY OUTPATIENT VISIT       Referring Provider:  Dr Shaji Galloway     Diagnosis:       ICD-10-CM ICD-9-CM    1. Nontraumatic tear of left rotator cuff M75.102 727.61    2. Acute postoperative pain of left shoulder G89.18 719.41     M25.512 338.18         Orders:  Evaluate and Treat   Date of Initial Evaluation: 02/12/2019      Visit # 13    SUBJECTIVE:  Reports that he continues to have soreness , tenderness and swelling in the larry- lateral arm / anterior deltoid.  .      HISTORY:  Patient reports he possibly had a stroke in June and around the same time he developed left shoulder pain. Pain was worse with everything including moving it and getting dressed. He wasn't sleeping at night due to shoulder pain. Tried medicine with no relief. He went to [facility] and was given a shoulder injection 1 month ago that did not help. At [facility] in  he was told that he is not a surgical candidate because "he can't move his arm." He cannot attend PT due to financial reasons. 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 He then came to [facility] in October , ended up seeing Dr Shaji Galloway and decision made to do surgery .      Surgery 11-12-18 :   1. Left shoulder arthroscopic rotator cuff repair supraspinatus   2. Placement of a left shoulder collagen scaffold allograft augmentation on posterosuperior rotator cuff repair  3. Left shoulder arthroscopic debridement labrum  4. left shoulder arthroscopic distal clavicle excision   5. left  shoulder arthroscopic subacromial decompression.     Has been doing fairly well since the surgery. Had a reaction to meds at first but corrected now. Is using cold and estim at home frequently. Has been doing wrist exercises and shoulder shrugs as well as fingers. Pain about shoulder as expected but he has been managing with little to no meds in past few days.      Current Pain: 8 /10          Worst pain: 9-10 /10  Best pain:  6 /10    Function: Patient " reports 55% disability based on score of the Upper Extremity Functional Scale. ( 93% at eval)     OBJECTIVE:    Observation /  Posture :  Minimal swelling in forearm / hand . No redness about incision sites -      Shoulder PROM:   Left   Flexion 170   Extension WFL   Abduction 100   Internal Rotation WFL   External Rotation 40         Other ROM:   Elbow: flexion = full     extension = -5 active / WFL passive   Pronation= Full   Supination: mild loss   Wrist: WFL all directions   Hand: WFL all      Palpation:  Mild to moderate tenderness about GH joint .       TREATMENT PROVIDED:  -Manual Therapy: STM to anterior deltoid, grade 1-2 mobs to GH joint  (10 min)  -Therapeutic Exercise:  UBE 3 / 3 , seated AAROM on pulleys 3 x 2 mins , PROM for flexion, Abduction, IR, ER , AROM for elbow / wrist and AAROM for shoulder flexion,  .  scapular circles x 30  , isometrics all directions for shoulder - with PT 3 x 10 all  supine protraction / punches 3 x 10 with 4#, scapular shrugs 3 x 15 with 5# , scapular retraction 3 x 15 20# at pulleys,  ( 45 mins )   -Modalities: MH and stim to shoulder x 15 mins ,   -Education on HEP, condition and posture, activity modification.       ASSESSMENT:  The patient is a 43 y.o. year old male who presents to physical therapy s/p rotator cuff repair to left shoulder. Instructions to follow protocol for large repair. Patient's impairments at evaluation included loss of ROM and strength as would be expected post operatively.   Pain in anterior deltoid possible strain. No warmth , redness or discoloration.   Treatment modified slightly with decreased stress on flexion / elevation today and will gradually increase / resume .                            Short Term Goals:  1-4 weeks  1. Pt will report a subjective decrease in pain / symptoms     MET   2. Pt will be instructed in and independent in basic home exercises / self care MET      Long Term Goals: 8-12 weeks  1. PROM to full all directions     ALMOST  MET   2. 4/5 MMT for shoulder motions at 12 weeks   3. Full active ROM by 16 weeks post operatively   4. Minimal to no pain with all ADLs    PLAN:  Patient will benefit from physical therapy (1-2) x/week for (16) weeks including manual therapy, therapeutic exercise, functional activities, modalities, and patient education.    Thank you for this referral.      Saleem Shook, PT, DPT

## 2019-02-15 ENCOUNTER — CLINICAL SUPPORT (OUTPATIENT)
Dept: REHABILITATION | Facility: HOSPITAL | Age: 44
End: 2019-02-15
Payer: COMMERCIAL

## 2019-02-15 DIAGNOSIS — M75.102 NONTRAUMATIC TEAR OF LEFT ROTATOR CUFF: Primary | ICD-10-CM

## 2019-02-15 PROCEDURE — 97140 MANUAL THERAPY 1/> REGIONS: CPT

## 2019-02-15 PROCEDURE — 97014 ELECTRIC STIMULATION THERAPY: CPT

## 2019-02-15 PROCEDURE — 97110 THERAPEUTIC EXERCISES: CPT

## 2019-02-15 NOTE — PROGRESS NOTES
"PHYSICAL THERAPY OUTPATIENT VISIT       Referring Provider:  Dr Shaji Galloway     Diagnosis:       ICD-10-CM ICD-9-CM    1. Nontraumatic tear of left rotator cuff M75.102 727.61         Orders:  Evaluate and Treat   Date of Initial Evaluation: 02/15/2019      Visit # 14    SUBJECTIVE: Reports that the larry- lateral arm / anterior deltoid is feeling better.       HISTORY:  Patient reports he possibly had a stroke in June and around the same time he developed left shoulder pain. Pain was worse with everything including moving it and getting dressed. He wasn't sleeping at night due to shoulder pain. Tried medicine with no relief. He went to [facility] and was given a shoulder injection 1 month ago that did not help. At [facility] in  he was told that he is not a surgical candidate because "he can't move his arm." He cannot attend PT due to financial reasons. 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 He then came to [facility] in October , ended up seeing Dr Shaji Galloway and decision made to do surgery .      Surgery 11-12-18 :   1. Left shoulder arthroscopic rotator cuff repair supraspinatus   2. Placement of a left shoulder collagen scaffold allograft augmentation on posterosuperior rotator cuff repair  3. Left shoulder arthroscopic debridement labrum  4. left shoulder arthroscopic distal clavicle excision   5. left  shoulder arthroscopic subacromial decompression.     Has been doing fairly well since the surgery. Had a reaction to meds at first but corrected now. Is using cold and estim at home frequently. Has been doing wrist exercises and shoulder shrugs as well as fingers. Pain about shoulder as expected but he has been managing with little to no meds in past few days.      Current Pain: 8 /10          Worst pain: 9-10 /10  Best pain:  6 /10    Function: Patient reports 55% disability based on score of the Upper Extremity Functional Scale. ( 93% at eval)     OBJECTIVE:    Observation / "  Posture :  Minimal swelling in forearm / hand . No redness about incision sites -      Shoulder PROM:   Left   Flexion 170   Extension WFL   Abduction 100   Internal Rotation WFL   External Rotation 40         Other ROM:   Elbow: flexion = full     extension = -5 active / WFL passive   Pronation= Full   Supination: mild loss   Wrist: WFL all directions   Hand: WFL all      Palpation:  Mild to moderate tenderness about GH joint .       TREATMENT PROVIDED:  -Manual Therapy: STM to anterior deltoid, grade 1-2 mobs to GH joint  (10 min)  -Therapeutic Exercise:  UBE 3 / 3 , seated AAROM on pulleys 3 x 2 mins , PROM for flexion, Abduction, IR, ER , AROM for elbow / wrist and AAROM for shoulder flexion,  .  scapular circles x 30  , isometrics all directions for shoulder - with PT 3 x 10 all  supine protraction / punches 3 x 10 with 4#, scapular shrugs 3 x 15 with 5# , scapular retraction 3 x 15 20# at pulleys,  ( 45 mins )   -Modalities: MH and stim to shoulder x 15 mins ,   -Education on HEP, condition and posture, activity modification.       ASSESSMENT:  The patient is a 43 y.o. year old male who presents to physical therapy s/p rotator cuff repair to left shoulder. Instructions to follow protocol for large repair. Patient's impairments at evaluation included loss of ROM and strength as would be expected post operatively.   Resolving anterior deltoid strain. Overall is progressing well and appropriately for this stage post operatively. .                             Short Term Goals:  1-4 weeks  1. Pt will report a subjective decrease in pain / symptoms     MET   2. Pt will be instructed in and independent in basic home exercises / self care MET      Long Term Goals: 8-12 weeks  1. PROM to full all directions    ALMOST  MET   2. 4/5 MMT for shoulder motions at 12 weeks   3. Full active ROM by 16 weeks post operatively    ALMOST MET   4. Minimal to no pain with all ADLs    PLAN:  Patient will benefit from physical  therapy (1-2) x/week for (16) weeks including manual therapy, therapeutic exercise, functional activities, modalities, and patient education.    Thank you for this referral.    Leonides Laboy, PT, OCS, FAAOMPT

## 2019-02-25 DIAGNOSIS — M75.102 NONTRAUMATIC TEAR OF LEFT ROTATOR CUFF: ICD-10-CM

## 2019-02-25 DIAGNOSIS — M75.22 BICEPS TENDINITIS OF LEFT UPPER EXTREMITY: ICD-10-CM

## 2019-02-25 DIAGNOSIS — M19.012 ARTHRITIS OF LEFT ACROMIOCLAVICULAR JOINT: ICD-10-CM

## 2019-02-25 DIAGNOSIS — Z98.890 S/P SHOULDER SURGERY: ICD-10-CM

## 2019-02-26 RX ORDER — TRAMADOL HYDROCHLORIDE 50 MG/1
50 TABLET ORAL EVERY 12 HOURS PRN
Qty: 20 TABLET | Refills: 0 | Status: SHIPPED | OUTPATIENT
Start: 2019-02-26 | End: 2019-03-11

## 2019-02-26 RX ORDER — HYDROCODONE BITARTRATE AND ACETAMINOPHEN 5; 325 MG/1; MG/1
1 TABLET ORAL EVERY 12 HOURS PRN
Qty: 20 TABLET | Refills: 0 | Status: SHIPPED | OUTPATIENT
Start: 2019-02-26 | End: 2019-03-11 | Stop reason: ALTCHOICE

## 2019-03-08 ENCOUNTER — CLINICAL SUPPORT (OUTPATIENT)
Dept: REHABILITATION | Facility: HOSPITAL | Age: 44
End: 2019-03-08
Payer: COMMERCIAL

## 2019-03-08 DIAGNOSIS — M75.102 NONTRAUMATIC TEAR OF LEFT ROTATOR CUFF: Primary | ICD-10-CM

## 2019-03-08 DIAGNOSIS — M25.512 ACUTE POSTOPERATIVE PAIN OF LEFT SHOULDER: ICD-10-CM

## 2019-03-08 DIAGNOSIS — G89.18 ACUTE POSTOPERATIVE PAIN OF LEFT SHOULDER: ICD-10-CM

## 2019-03-08 PROCEDURE — 97110 THERAPEUTIC EXERCISES: CPT

## 2019-03-08 PROCEDURE — 97014 ELECTRIC STIMULATION THERAPY: CPT

## 2019-03-08 PROCEDURE — 97140 MANUAL THERAPY 1/> REGIONS: CPT

## 2019-03-08 NOTE — PROGRESS NOTES
"PHYSICAL THERAPY OUTPATIENT VISIT       Referring Provider:  Dr Shaji Galloway     Diagnosis:       ICD-10-CM ICD-9-CM    1. Nontraumatic tear of left rotator cuff M75.102 727.61    2. Acute postoperative pain of left shoulder G89.18 719.41     M25.512 338.18         Orders:  Evaluate and Treat   Date of Initial Evaluation: 03/08/2019      Visit # 15    SUBJECTIVE: Reports that the larry- lateral arm / deltoid is his main problem , with continued aching . He has pretty good function and is moving arm well. No problem with basic ADLs and no pain in GH joint - only aching is in upper arm . Reports constant swelling in same.     HISTORY:  Patient reports he possibly had a stroke in June and around the same time he developed left shoulder pain. Pain was worse with everything including moving it and getting dressed. He wasn't sleeping at night due to shoulder pain. Tried medicine with no relief. [facility] a shoulder injection 1 month ago that did not help. At [facility] in  he was told that he is not a surgical candidate because "he can't move his arm." He cannot attend PT due to financial reasons. 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 He then came to [facility] in October , ended up seeing Dr Shaji Galloway and decision made to do surgery .      Surgery 11-12-18 :   1. Left shoulder arthroscopic rotator cuff repair supraspinatus   2. Placement of a left shoulder collagen scaffold allograft augmentation on posterosuperior rotator cuff repair  3. Left shoulder arthroscopic debridement labrum  4. left shoulder arthroscopic distal clavicle excision   5. left  shoulder arthroscopic subacromial decompression.     Has been doing fairly well since the surgery. Had a reaction to meds at first but corrected now. Is using cold and estim at home frequently. Has been doing wrist exercises and shoulder shrugs as well as fingers. Pain about shoulder as expected but he has been " managing with little to no meds in past few days.      Current Pain: 8 /10          Worst pain: 9-10 /10  Best pain:  6 /10    OBJECTIVE:    Observation /  Posture :  Minimal swelling in forearm / hand . No redness about incision sites -      Shoulder PROM:   Left   Flexion 170   Extension WFL   Abduction 100   Internal Rotation WFL   External Rotation 40         Other ROM:   Elbow: flexion = full     extension =  WFL    Pronation= Full   Supination: mild loss   Wrist: WFL all directions   Hand: WFL all      Palpation:  Mild to moderate tenderness about GH joint .       TREATMENT PROVIDED:  -Manual Therapy: STM to anterior deltoid, grade 1-2 mobs to GH joint  , TDN to deltoid with stim x 6 mins (10 min)  -Therapeutic Exercise:  UBE 3 / 3 , seated AAROM on pulleys 3 x 2 mins , PROM for flexion, Abduction, IR, ER , AROM for elbow / wrist and AAROM for shoulder flexion,  .  scapular circles x 30  , isometrics all directions for shoulder - with PT 3 x 10 all  supine protraction / punches 3 x 10 with 4#, scapular shrugs 3 x 15 with 5# , scapular retraction 3 x 15 20# at pulleys,  ( 45 mins )   -Modalities: MH and stim to shoulder x 15 mins ,   -Education on HEP, condition and posture, activity modification.       ASSESSMENT:  The patient is a 43 y.o. year old male who presents to physical therapy s/p rotator cuff repair to left shoulder. Instructions to follow protocol for large repair. Patient's impairments at evaluation included loss of ROM and strength as would be expected post operatively.   Overall is progressing well and making progress towards all goals set. Good tolerance to TDN today to decrease tone of deltoid.  Overall movement is improved and he is making progress towards goals set.     Short Term Goals:  1-4 weeks  1. Pt will report a subjective decrease in pain / symptoms     MET   2. Pt will be instructed in and independent in basic home exercises / self care MET      Long Term Goals: 8-12 weeks  1. PROM  to full all directions    ALMOST  MET   2. 4/5 MMT for shoulder motions at 12 weeks   3. Full active ROM by 16 weeks post operatively    ALMOST MET   4. Minimal to no pain with all ADLs    PLAN:  Patient will benefit from physical therapy (1-2) x/week for (16) weeks including manual therapy, therapeutic exercise, functional activities, modalities, and patient education.    Thank you for this referral.    Leonides Laboy, PT, OCS, FAAOMPT

## 2019-03-11 DIAGNOSIS — M75.102 NONTRAUMATIC TEAR OF LEFT ROTATOR CUFF: ICD-10-CM

## 2019-03-11 DIAGNOSIS — M75.22 BICEPS TENDINITIS OF LEFT UPPER EXTREMITY: ICD-10-CM

## 2019-03-11 DIAGNOSIS — Z98.890 S/P SHOULDER SURGERY: ICD-10-CM

## 2019-03-11 DIAGNOSIS — M19.012 ARTHRITIS OF LEFT ACROMIOCLAVICULAR JOINT: ICD-10-CM

## 2019-03-11 RX ORDER — TRAMADOL HYDROCHLORIDE 50 MG/1
50 TABLET ORAL EVERY 12 HOURS PRN
Qty: 20 TABLET | Refills: 0 | Status: CANCELLED | OUTPATIENT
Start: 2019-03-11

## 2019-03-11 RX ORDER — MELOXICAM 15 MG/1
15 TABLET ORAL DAILY
Qty: 30 TABLET | Refills: 2 | Status: SHIPPED | OUTPATIENT
Start: 2019-03-11 | End: 2019-10-24

## 2019-03-11 RX ORDER — HYDROCODONE BITARTRATE AND ACETAMINOPHEN 5; 325 MG/1; MG/1
1 TABLET ORAL EVERY 12 HOURS PRN
Qty: 20 TABLET | Refills: 0 | Status: CANCELLED | OUTPATIENT
Start: 2019-03-11

## 2019-03-12 ENCOUNTER — CLINICAL SUPPORT (OUTPATIENT)
Dept: REHABILITATION | Facility: HOSPITAL | Age: 44
End: 2019-03-12
Payer: COMMERCIAL

## 2019-03-12 DIAGNOSIS — G89.18 ACUTE POSTOPERATIVE PAIN OF LEFT SHOULDER: Primary | ICD-10-CM

## 2019-03-12 DIAGNOSIS — M25.512 ACUTE POSTOPERATIVE PAIN OF LEFT SHOULDER: Primary | ICD-10-CM

## 2019-03-12 PROCEDURE — 97110 THERAPEUTIC EXERCISES: CPT

## 2019-03-12 NOTE — PROGRESS NOTES
"PHYSICAL THERAPY OUTPATIENT VISIT / PROGRESS NOTE       Referring Provider:  Dr Shaji Galloway     Diagnosis:       ICD-10-CM ICD-9-CM    1. Acute postoperative pain of left shoulder G89.18 719.41     M25.512 338.18         Orders:  Evaluate and Treat   Date of Initial Evaluation: 03/12/2019    Visit # 16    SUBJECTIVE: Reports that he continues to do very well. He has no pain in shoulder itself  - only aching is in upper arm - where he reports constant swelling as well. Patient states that he is doing all ADLs without any difficulty and without pain . Dressing , reaching , grooming , lifting activities are all good.     HISTORY:  Patient reports he possibly had a stroke in June and around the same time he developed left shoulder pain. Pain was worse with everything including moving it and getting dressed. He wasn't sleeping at night due to shoulder pain. Tried medicine with no relief. [facility] and was given a shoulder injection 1 month ago that did not help. At [facility] in  he was told that he is not a surgical candidate because "he can't move his arm." He cannot attend PT due to financial reasons. 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 He then came to [facility] in October , ended up seeing Dr Shaji Galloway and decision made to do surgery .      Surgery 11-12-18 :   1. Left shoulder arthroscopic rotator cuff repair supraspinatus   2. Placement of a left shoulder collagen scaffold allograft augmentation on posterosuperior rotator cuff repair  3. Left shoulder arthroscopic debridement labrum  4. left shoulder arthroscopic distal clavicle excision   5. left  shoulder arthroscopic subacromial decompression.     Has been doing fairly well since the surgery. Had a reaction to meds at first but corrected now. Is using cold and estim at home frequently. Has been doing wrist exercises and shoulder shrugs as well as fingers. Pain about shoulder as expected but he has been managing " with little to no meds in past few days.      Current Pain: 0 /10          Worst pain: 1 /10  Best pain:  0 /10    OBJECTIVE:    Observation /  Posture :  Minimal swelling upper arm.      Shoulder A/PROM:   Left   Flexion 170/ 175   Extension WFL   Abduction 160/ 175   Internal Rotation WFL   External Rotation WFL         Other ROM:   Elbow: flexion = full     extension =  WFL    Pronation= Full   Supination: mild loss   Wrist: WFL all directions   Hand: WFL all      Palpation:  Mild to moderate tenderness about GH joint .       TREATMENT PROVIDED:  -Manual Therapy:   -Therapeutic Exercise:  UBE 3 / 3 , seated rows 55# 3 x 15 , chest press 40# 3 x 15 , OH press 5# 3 x 10 ,  Side ly ER 5# 3 x 15 .  scapular circles x 30  , isometrics all directions for shoulder - with PT 3 x 10 all  supine protraction / punches 3 x 10 with 5#, scapular shrugs 3 x 15 with 10# each  , diagonals at pulleys - hi and low  30# 3 x 15 each   ( 45 mins )   -Modalities: --------------  -Education on HEP, condition and posture, activity modification.       ASSESSMENT:  The patient is a 43 y.o. year old male who presents to physical therapy s/p rotator cuff repair to left shoulder. Instructions to follow protocol for large repair. Patient's impairments at evaluation included loss of ROM and strength as would be expected post operatively.   Overall is progressing very well and has met all goals set. He may be able to progress to self care / HEP .  He lives a great distance from clinic and travel is an issue. As he is doing excellent he could continue at home.     Short Term Goals:  1-4 weeks  1. Pt will report a subjective decrease in pain / symptoms     MET   2. Pt will be instructed in and independent in basic home exercises / self care MET      Long Term Goals: 8-12 weeks  1. PROM to full all directions    MET   2. 4/5 MMT for shoulder motions at 12 weeks MET   3. Full active ROM by 16 weeks post operatively    MET   4. Minimal to no pain  with all ADLs   MET    PLAN:  PT will contact MD regarding going forth with HEP     Thank you for this referral.    Leonides Laboy, PT, OCS, FAAOMPT

## 2019-03-27 ENCOUNTER — CLINICAL SUPPORT (OUTPATIENT)
Dept: REHABILITATION | Facility: HOSPITAL | Age: 44
End: 2019-03-27
Payer: COMMERCIAL

## 2019-03-27 DIAGNOSIS — G89.18 ACUTE POSTOPERATIVE PAIN OF LEFT SHOULDER: Primary | ICD-10-CM

## 2019-03-27 DIAGNOSIS — M75.102 NONTRAUMATIC TEAR OF LEFT ROTATOR CUFF: ICD-10-CM

## 2019-03-27 DIAGNOSIS — M25.512 ACUTE POSTOPERATIVE PAIN OF LEFT SHOULDER: Primary | ICD-10-CM

## 2019-03-27 PROCEDURE — 97014 ELECTRIC STIMULATION THERAPY: CPT

## 2019-03-27 PROCEDURE — 97110 THERAPEUTIC EXERCISES: CPT

## 2019-03-27 NOTE — PROGRESS NOTES
"PHYSICAL THERAPY OUTPATIENT VISIT / PROGRESS NOTE       Referring Provider:  Dr Shaji Galloway     Diagnosis:       ICD-10-CM ICD-9-CM    1. Acute postoperative pain of left shoulder G89.18 719.41     M25.512 338.18    2. Nontraumatic tear of left rotator cuff M75.102 727.61         Orders:  Evaluate and Treat   Date of Initial Evaluation: 03/27/2019    Visit # 17    SUBJECTIVE: Reports that he is doing well except for an aching / throbbing persistent pain , not in the shoulder , but in the anterolateral mid humerus.  Does not seem to be affected by movement or position or muscle contraction.     HISTORY:  Patient reports he possibly had a stroke in June and around the same time he developed left shoulder pain. Pain was worse with everything including moving it and getting dressed. He wasn't sleeping at night due to shoulder pain. Tried medicine with no relief. He went to [facility] and was given a shoulder injection 1 month ago that did not help. At [facility] in  he was told that he is not a surgical candidate because "he can't move his arm." He cannot attend PT due to financial reasons. 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 He then came to [facility] in October , ended up seeing Dr Shaji Galloway and decision made to do surgery .      Surgery 11-12-18 :   1. Left shoulder arthroscopic rotator cuff repair supraspinatus   2. Placement of a left shoulder collagen scaffold allograft augmentation on posterosuperior rotator cuff repair  3. Left shoulder arthroscopic debridement labrum  4. left shoulder arthroscopic distal clavicle excision   5. left  shoulder arthroscopic subacromial decompression.     Current Pain: 0 /10          Worst pain: 5 /10  Best pain:  0 /10    OBJECTIVE:    Observation /  Posture :  Minimal swelling upper arm.      Shoulder A/PROM:   Left   Flexion 170/ 175   Extension WFL   Abduction 160/ 175   Internal Rotation WFL   External Rotation WFL         Other ROM: "   Elbow: flexion = full     extension =  WFL    Pronation= Full   Supination: mild loss   Wrist: WFL all directions   Hand: WFL all      Palpation:  Mild to moderate tenderness about GH joint .       TREATMENT PROVIDED:  -Manual Therapy: GH joint mobs for posterior and inferior glides  ( 5 mins)   -Therapeutic Exercise:  UBE 3 / 3 , seated rows 55# 3 x 15 , chest press 40# 3 x 15 , OH press 5# 3 x 10 ,  Side ly ER 5# 3 x 15 .  scapular circles x 30  ,  supine protraction / punches 3 x 10 with 7#, scapular shrugs 3 x 15 with 10# each  , diagonals at pulleys - hi and low  30# 3 x 15 each , press ups from plinth 3 x 15, shoulder taps / WB on contralateral x 30 , serratus with roll on wall x 30   ( 45 mins )   -Modalities: MH and premod stim to shoulder  -Education on HEP, condition and posture, activity modification.       ASSESSMENT:  The patient is a 43 y.o. year old male who presents to physical therapy s/p rotator cuff repair to left shoulder. Instructions to follow protocol for large repair. Patient's impairments at evaluation included loss of ROM and strength as would be expected post operatively.   Overall is progressing very well and has met all goals set. He may be able to progress to self care / HEP .  Only issue currently is that of the aching pain in mid arm.   Note sent to MD re such.     Short Term Goals:  1-4 weeks  1. Pt will report a subjective decrease in pain / symptoms     MET   2. Pt will be instructed in and independent in basic home exercises / self care MET      Long Term Goals: 8-12 weeks  1. PROM to full all directions    MET   2. 4/5 MMT for shoulder motions at 12 weeks MET   3. Full active ROM by 16 weeks post operatively    MET   4. Minimal to no pain with all ADLs   MET    PLAN:  PT will contact MD regarding going forth with HEP     Thank you for this referral.    Leonides Laboy, PT, OCS, FAAOMPT

## 2019-04-30 ENCOUNTER — OFFICE VISIT (OUTPATIENT)
Dept: SPORTS MEDICINE | Facility: CLINIC | Age: 44
End: 2019-04-30
Payer: COMMERCIAL

## 2019-04-30 VITALS
DIASTOLIC BLOOD PRESSURE: 92 MMHG | SYSTOLIC BLOOD PRESSURE: 152 MMHG | HEIGHT: 72 IN | WEIGHT: 238 LBS | HEART RATE: 92 BPM | BODY MASS INDEX: 32.23 KG/M2

## 2019-04-30 DIAGNOSIS — Z98.890 S/P ROTATOR CUFF REPAIR: Primary | ICD-10-CM

## 2019-04-30 DIAGNOSIS — M25.512 LEFT SHOULDER PAIN: ICD-10-CM

## 2019-04-30 PROCEDURE — 99214 PR OFFICE/OUTPT VISIT, EST, LEVL IV, 30-39 MIN: ICD-10-PCS | Mod: S$GLB,,, | Performed by: ORTHOPAEDIC SURGERY

## 2019-04-30 PROCEDURE — 99214 OFFICE O/P EST MOD 30 MIN: CPT | Mod: S$GLB,,, | Performed by: ORTHOPAEDIC SURGERY

## 2019-04-30 PROCEDURE — 99999 PR PBB SHADOW E&M-EST. PATIENT-LVL III: ICD-10-PCS | Mod: PBBFAC,,, | Performed by: ORTHOPAEDIC SURGERY

## 2019-04-30 PROCEDURE — 99999 PR PBB SHADOW E&M-EST. PATIENT-LVL III: CPT | Mod: PBBFAC,,, | Performed by: ORTHOPAEDIC SURGERY

## 2019-04-30 NOTE — PROGRESS NOTES
CC: Left shoulder post op 6 months    Juan Bennett returns to clinic for follow up evaluation.  He has finished physical therapy at [facility] location; transition to [facility].  Reports doing well.    DATE OF SURGERY:11/12/2018        PREOPERATIVE DIAGNOSES:   1. Right shoulder rotator cuff tear   2. Right shoulder labral tear  3. Right shoulder AC joint arthritis     POSTOPERATIVE DIAGNOSES:   1. Right shoulder rotator cuff tear   2. Right shoulder labral tear  3. Right shoulder AC joint arthritis     PROCEDURE:   1. Right shoulder arthroscopic rotator cuff repair supraspinatus   2. Placement of a right shoulder collagen scaffold allograft augmentation on posterosuperior rotator cuff repair  3. Right shoulder arthroscopic debridement labrum  4. Right shoulder arthroscopic distal clavicle excision   5. Right shoulder arthroscopic subacromial decompression.      SURGEON: Shaji Galloway M.D.     Review of Systems   Constitution: Negative. Negative for chills, fever and night sweats.    Cardiovascular: Negative for chest pain and syncope.   Respiratory: Negative for cough and shortness of breath.   Gastrointestinal: Negative for abdominal pain and bowel incontinence.     PE:  Vitals:    04/30/19 1450   BP: (!) 152/92   Pulse: 92   Weight: 108 kg (238 lb)   Height: 6' (1.829 m)   PainSc:   4     Left shoulder  Incision healed  No sign of infection  Swelling resolved  Compartments soft  Neurovascular status intact in extremity    AROM   degrees  65 degrees ER    Assessment:  Appropriate rotator cuff surgery recovery at 6 months    Plan:  Follow up 3 months

## 2019-05-02 ENCOUNTER — TELEPHONE (OUTPATIENT)
Dept: SPORTS MEDICINE | Facility: CLINIC | Age: 44
End: 2019-05-02

## 2019-05-02 NOTE — TELEPHONE ENCOUNTER
Received [employer] for Duty Certification. Form completed and faxed to [phone] per the patients request.    Colleen Amedee MA [facility]

## 2019-06-13 ENCOUNTER — OFFICE VISIT (OUTPATIENT)
Dept: PAIN MEDICINE | Facility: CLINIC | Age: 44
End: 2019-06-13
Payer: COMMERCIAL

## 2019-06-13 ENCOUNTER — HOSPITAL ENCOUNTER (OUTPATIENT)
Dept: RADIOLOGY | Facility: HOSPITAL | Age: 44
Discharge: HOME | End: 2019-06-13
Attending: PAIN MEDICINE
Payer: COMMERCIAL

## 2019-06-13 VITALS
WEIGHT: 277 LBS | SYSTOLIC BLOOD PRESSURE: 122 MMHG | DIASTOLIC BLOOD PRESSURE: 72 MMHG | BODY MASS INDEX: 37.52 KG/M2 | HEART RATE: 95 BPM | RESPIRATION RATE: 18 BRPM | HEIGHT: 72 IN

## 2019-06-13 DIAGNOSIS — M25.512 CHRONIC LEFT SHOULDER PAIN: Primary | ICD-10-CM

## 2019-06-13 DIAGNOSIS — G89.29 CHRONIC LEFT SHOULDER PAIN: ICD-10-CM

## 2019-06-13 DIAGNOSIS — G89.29 CHRONIC LEFT SHOULDER PAIN: Primary | ICD-10-CM

## 2019-06-13 DIAGNOSIS — M25.512 CHRONIC LEFT SHOULDER PAIN: ICD-10-CM

## 2019-06-13 PROCEDURE — 72040 XR CERVICAL SPINE AP LATERAL: ICD-10-PCS | Mod: 26,,, | Performed by: RADIOLOGY

## 2019-06-13 PROCEDURE — 99204 PR OFFICE/OUTPT VISIT, NEW, LEVL IV, 45-59 MIN: ICD-10-PCS | Mod: S$GLB,,, | Performed by: PAIN MEDICINE

## 2019-06-13 PROCEDURE — 72040 X-RAY EXAM NECK SPINE 2-3 VW: CPT | Mod: 26,,, | Performed by: RADIOLOGY

## 2019-06-13 PROCEDURE — 99204 OFFICE O/P NEW MOD 45 MIN: CPT | Mod: S$GLB,,, | Performed by: PAIN MEDICINE

## 2019-06-13 PROCEDURE — 99999 PR PBB SHADOW E&M-EST. PATIENT-LVL III: CPT | Mod: PBBFAC,,, | Performed by: PAIN MEDICINE

## 2019-06-13 PROCEDURE — 99999 PR PBB SHADOW E&M-EST. PATIENT-LVL III: ICD-10-PCS | Mod: PBBFAC,,, | Performed by: PAIN MEDICINE

## 2019-06-13 PROCEDURE — 72040 X-RAY EXAM NECK SPINE 2-3 VW: CPT | Mod: TC

## 2019-06-13 RX ORDER — TRAZODONE HYDROCHLORIDE 100 MG/1
100 TABLET ORAL DAILY
COMMUNITY
Start: 2019-04-15

## 2019-06-13 RX ORDER — GABAPENTIN 300 MG/1
300 CAPSULE ORAL 3 TIMES DAILY
Qty: 90 CAPSULE | Refills: 3 | Status: SHIPPED | OUTPATIENT
Start: 2019-06-13 | End: 2019-09-19 | Stop reason: SDUPTHER

## 2019-06-13 RX ORDER — METFORMIN HYDROCHLORIDE 1000 MG/1
1000 TABLET ORAL 2 TIMES DAILY
COMMUNITY
Start: 2019-05-13

## 2019-06-13 RX ORDER — BUSPIRONE HYDROCHLORIDE 15 MG/1
15 TABLET ORAL DAILY
COMMUNITY
Start: 2018-11-29

## 2019-06-13 NOTE — LETTER
June 13, 2019      [place] [id number] [facility] [address]           [facility]  [address] [facility] [address]  Phone: [phone]  Fax: [phone]          Patient: Juan Bennett   MR Number: [id number]   YOB: 1975   Date of Visit: 6/13/2019       Dear [facility]:    Thank you for referring Juan Bennett to me for evaluation. Attached you will find relevant portions of my assessment and plan of care.    If you have questions, please do not hesitate to call me. I look forward to following Juan Bennett along with you.    Sincerely,    Tapan Han MD    Enclosure  CC:  No Recipients    If you would like to receive this communication electronically, please contact [email or web address] or [phone] to request more information on [facility] Link access.    For providers and/or their staff who would like to refer a patient to [facility], please contact us through our one-stop-shop provider referral line, Ronal Dixon, at [phone].    If you feel you have received this communication in error or would no longer like to receive these types of communications, please e-mail [email or web address]

## 2019-06-13 NOTE — PROGRESS NOTES
Chief Pain Complaint:  Shoulder Pain    History of Present Illness:   This patient is a 43 y.o. male who presents today complaining of the above noted pain/s. The patient describes the pain as follows.  Mr. Bennett is new patient clinic with complaints of left shoulder pain. He reports approximately 1 year ago he had a completely torn left rotator cuff in addition to a fracture of his left scapula which required rotator cuff surgery.  He also suffered 3 strokes last year from which she has fully recovered.  He has been in physical therapy for several months and completed greater than 6 weeks physical therapy but at the end of March 2019.  He continues to have numbness tingling and weakness in the left upper extremity and excruciating pain when he palpates the area around his left shoulder.  He denies having had any cervical spine issues in the past.  He has tried numerous medications including Mobic, ibuprofen, icy Hot which have not helped in addition to heat and ice.  LE thinks he found to be helpful thus far has been diclofenac and Tylenol No.  3 and 4.  He had injection in his left shoulder a few weeks ago which he reports helped for approximately 2-3 days and then the effects of.  Today rates his pain as 6/10 and describes an aching sensation which is constant and then sharp from time to time which radiates into the left shoulder and down into the left biceps.  He does endorse having numbness and tingling distally into his hand which was not present prior to the surgery.  He denies symptoms in the right upper extremity.    Previous Therapy:  Medications:  Mobic, diclofenac, ibuprofen, Tylenol No.  3, Tylenol No.  4, icy Hot  Injections:  Left shoulder steroid injection  Surgeries: Left Rotator Cuff Repair  Physical Therapy: Recently Completed and Completed in the Past    Past Surgical History:   Procedure Laterality Date    APPENDECTOMY      ARTHROSCOPY, SHOULDER, WITH SUBACROMIAL SPACE DECOMPRESSION Left  11/12/2018    [facility]    CHOLECYSTECTOMY      ELBOW SURGERY Right     EXCISION, CLAVICLE, DISTAL Left 11/12/2018    [facility] at [facility]    HAND SURGERY Right     MOUTH SURGERY      REPAIR, ROTATOR CUFF, ARTHROSCOPIC Left 11/12/2018    [facility] at [facility] OR    WRIST SURGERY       Imaging / Labs / Studies (reviewed on 6/13/2019):  Review of Systems:  Review of Systems   Constitutional: Negative for fever.   Eyes: Negative for blurred vision.   Respiratory: Negative for cough and wheezing.    Cardiovascular: Negative for chest pain and orthopnea.   Gastrointestinal: Negative for constipation, diarrhea, nausea and vomiting.   Genitourinary: Negative for dysuria.   Musculoskeletal: Positive for joint pain. Negative for back pain and neck pain.   Skin: Negative for itching and rash.   Neurological: Positive for weakness.   Endo/Heme/Allergies: Does not bruise/bleed easily.       Physical Exam:  /72 (BP Location: Right arm, Patient Position: Sitting, BP Method: Medium (Automatic))   Pulse 95   Resp 18   Ht 6' (1.829 m)   Wt 125.6 kg (277 lb)   BMI 37.57 kg/m²  (reviewed on 6/13/2019)\  General    Constitutional: He is oriented to person, place, and time. He appears well-developed and well-nourished.   HENT:   Head: Normocephalic and atraumatic.   Eyes: EOM are normal.   Neck: Neck supple.   Pulmonary/Chest: Effort normal.   Abdominal: He exhibits no distension.   Neurological: He is alert and oriented to person, place, and time. No cranial nerve deficit.   Psychiatric: He has a normal mood and affect.         Right Shoulder Exam   Right shoulder exam is normal.    Left Shoulder Exam     Inspection/Observation   Swelling: present  Deformity: present    Tenderness   The patient is tender to palpation of the acromioclavicular joint, biceps tendon and supraspinatus.    Comments:  Decreased sensation to palpation of  the left shoulder; intact on the right; increased pain with palpation left shoulder radiating into the left hand      Muscle Strength   Left Upper Extremity  Shoulder Internal Rotation: 5/5   Shoulder External Rotation: 5/5   Supraspinatus: 5/5/5   Subscapularis: 5/5/5     Reflexes     Left Side  Biceps:  2+  Triceps:  2+  Left Barron's Sign:  Absent    Right Side   Right Barron's Sign:  absent      Assessment  Postop surgical pain  Left shoulder pain    1. 43 y.o. year old patient with PMH of   Past Medical History:   Diagnosis Date    Hypercholesteremia     Hypertension     Stroke 06/2018      presenting with pain located left shoulder  2. Pain Generators / Etiology:  Left shoulder postop surgical pain; rule out cervical spine pain  3. Failed Meds (E- Effective, NE- Not Effective):  Tylenol No.  3-effective, Tylenol No.  4-effective, diclofenac-effective, Mobic-on effective, ibuprofen-not effective, icy Hot-not effective  4. Physical Therapy - Recently Completed and Completed in the Past  5. Psychological comorbidities - None  6. Anticoagulants / Antiplatelets: Aspirin 81mg     PLAN:  1. Medications:  Will start gabapentin 300 mg 3 times daily; continue Tylenol over-the-counter as needed    2. PT - complete physical therapy greater than 6 weeks in the last 2 months  3. Psychological - none  4. Labs - obtain  none  5. Imaging - obtain cervical spine x-ray today to rule out radiculopathy  6. Interventions - schedule none  7. Referrals - none  8. Records - none  9. Follow up visit - follow up in clinic in 6 weeks  10. Patient Questions - answered all of the patient's questions regarding diagnosis, therapy, and treatment  11.  This condition does not require this patient to take time off of work    ESE Han MD  Interventional Pain  [facility] [place]

## 2019-06-13 NOTE — PATIENT INSTRUCTIONS
-obtain C-spine x-ray today  -prescribed gabapentin 300 mg 3 times daily  -follow up in clinic in 6 weeks

## 2019-07-25 ENCOUNTER — OFFICE VISIT (OUTPATIENT)
Dept: PAIN MEDICINE | Facility: CLINIC | Age: 44
End: 2019-07-25
Payer: COMMERCIAL

## 2019-07-25 VITALS
HEIGHT: 72 IN | SYSTOLIC BLOOD PRESSURE: 119 MMHG | HEART RATE: 78 BPM | BODY MASS INDEX: 37.5 KG/M2 | WEIGHT: 276.88 LBS | DIASTOLIC BLOOD PRESSURE: 76 MMHG

## 2019-07-25 DIAGNOSIS — M54.12 CERVICAL RADICULOPATHY: Primary | ICD-10-CM

## 2019-07-25 DIAGNOSIS — M75.102 NONTRAUMATIC TEAR OF LEFT ROTATOR CUFF: ICD-10-CM

## 2019-07-25 PROCEDURE — 99999 PR PBB SHADOW E&M-EST. PATIENT-LVL III: ICD-10-PCS | Mod: PBBFAC,,, | Performed by: PAIN MEDICINE

## 2019-07-25 PROCEDURE — 99214 PR OFFICE/OUTPT VISIT, EST, LEVL IV, 30-39 MIN: ICD-10-PCS | Mod: S$GLB,,, | Performed by: PAIN MEDICINE

## 2019-07-25 PROCEDURE — 99999 PR PBB SHADOW E&M-EST. PATIENT-LVL III: CPT | Mod: PBBFAC,,, | Performed by: PAIN MEDICINE

## 2019-07-25 PROCEDURE — 99214 OFFICE O/P EST MOD 30 MIN: CPT | Mod: S$GLB,,, | Performed by: PAIN MEDICINE

## 2019-07-25 RX ORDER — CELECOXIB 200 MG/1
200 CAPSULE ORAL 2 TIMES DAILY
Refills: 3 | COMMUNITY
Start: 2019-07-24

## 2019-07-25 RX ORDER — BACLOFEN 10 MG/1
10 TABLET ORAL 2 TIMES DAILY PRN
Qty: 30 TABLET | Refills: 3 | Status: SHIPPED | OUTPATIENT
Start: 2019-07-25 | End: 2019-09-19 | Stop reason: SDUPTHER

## 2019-07-25 NOTE — PROGRESS NOTES
Chief Pain Complaint:  Shoulder Pain (l shoulder pain 6/10)    History of Present Illness:   Mr. Bennett returns to clinic for follow-up.  At her last visit we started gabapentin from which she reports having minimal relief in addition to obtaining cervical spine x-ray.  He has been seen by nurse practitioner in the past and they have ordered a an MRI of the left humerus and he has brought report him today.  The impression from the report shows essentially unremarkable MRI of the left humerus/upper arm.  Incompletely visualized postsurgical changes along the left shoulder, cannot exclude recurrent rotator cuff tear.  Currently his pain is rated 6/10 and he reports having 0 symptomatic relief from his previous visit.  Most of his symptoms are located primarily in the left shoulder and radiating into the left biceps occasionally will feel numbness and tingling in his hand.  He has a history of carpal tunnel in the right hand for which he has undergone surgery in his symptoms have resolved.  Most the symptoms are in the palmar aspect of the hand and in the distal digits.  He is currently taking the gabapentin 300 mg at breakfast and 600 mg at night as he was unable to tolerate 3 times daily dosing at 300 mg.  He denies having bowel bladder difficulties.    Initial HPI:  This patient is a 43 y.o. male who presents today complaining of the above noted pain/s. The patient describes the pain as follows.  Mr. Bennett is new patient clinic with complaints of left shoulder pain. He reports approximately 1 year ago he had a completely torn left rotator cuff in addition to a fracture of his left scapula which required rotator cuff surgery.  He also suffered 3 strokes last year from which she has fully recovered.  He has been in physical therapy for several months and completed greater than 6 weeks physical therapy but at the end of March 2019.  He continues to have numbness tingling and weakness in the left upper extremity and  excruciating pain when he palpates the area around his left shoulder.  He denies having had any cervical spine issues in the past.  He has tried numerous medications including Mobic, ibuprofen, icy Hot which have not helped in addition to heat and ice.  LE thinks he found to be helpful thus far has been diclofenac and Tylenol No.  3 and 4.  He had injection in his left shoulder a few weeks ago which he reports helped for approximately 2-3 days and then the effects of.  Today rates his pain as 6/10 and describes an aching sensation which is constant and then sharp from time to time which radiates into the left shoulder and down into the left biceps.  He does endorse having numbness and tingling distally into his hand which was not present prior to the surgery.  He denies symptoms in the right upper extremity.    Previous Therapy:  Medications:  Mobic, diclofenac, ibuprofen, Tylenol No.  3, Tylenol No.  4, icy Hot, gabapentin  Injections:  Left shoulder steroid injection  Surgeries: Left Rotator Cuff Repair  Physical Therapy: Recently Completed and Completed in the Past    Past Surgical History:   Procedure Laterality Date    APPENDECTOMY      ARTHROSCOPY, SHOULDER, WITH SUBACROMIAL SPACE DECOMPRESSION Left 11/12/2018    [facility] at [facility] OR    CHOLECYSTECTOMY      ELBOW SURGERY Right     EXCISION, CLAVICLE, DISTAL Left 11/12/2018    [facility] at [facility] OR    HAND SURGERY Right     MOUTH SURGERY      REPAIR, ROTATOR CUFF, ARTHROSCOPIC Left 11/12/2018    [facility] at [facility] OR    WRIST SURGERY       Imaging / Labs / Studies (reviewed on 7/25/2019):  Review of Systems:  Review of Systems   Constitutional: Negative for fever.   Eyes: Negative for blurred vision.   Respiratory: Negative for cough and wheezing.    Cardiovascular: Negative for chest pain and orthopnea.   Gastrointestinal: Negative for constipation, diarrhea, nausea and  vomiting.   Genitourinary: Negative for dysuria.   Musculoskeletal: Positive for joint pain. Negative for back pain and neck pain.   Skin: Negative for itching and rash.   Neurological: Positive for weakness.   Endo/Heme/Allergies: Does not bruise/bleed easily.       Physical Exam:  /76 (BP Location: Right arm, Patient Position: Sitting, BP Method: Large (Automatic))   Pulse 78   Ht 6' (1.829 m)   Wt 125.6 kg (276 lb 14.4 oz)   BMI 37.55 kg/m²  (reviewed on 7/25/2019)\  General    Constitutional: He is oriented to person, place, and time. He appears well-developed and well-nourished.   HENT:   Head: Normocephalic and atraumatic.   Eyes: EOM are normal.   Neck: Neck supple.   Pulmonary/Chest: Effort normal.   Abdominal: He exhibits no distension.   Neurological: He is alert and oriented to person, place, and time. No cranial nerve deficit.   Psychiatric: He has a normal mood and affect.         Right Shoulder Exam   Right shoulder exam is normal.    Left Shoulder Exam     Inspection/Observation   Swelling: absent  Deformity: absent    Tenderness   The patient is tender to palpation of the acromioclavicular joint, biceps tendon and supraspinatus.    Tests & Signs   Cross arm: negative  Negrete test: negative  Impingement: positive  Lift Off Sign: positive  Belly Press: negative    Comments:  Sensation intact bilateral upper extremities;      Muscle Strength   Left Upper Extremity  Shoulder Internal Rotation: 5/5   Shoulder External Rotation: 5/5   Supraspinatus: 5/5/5   Subscapularis: 5/5/5     Reflexes     Left Side  Biceps:  2+  Triceps:  2+  Left Barron's Sign:  Absent    Right Side   Right Barron's Sign:  absent      Assessment  Postop surgical pain  Left shoulder pain    1. 43 y.o. year old patient with PMH of   Past Medical History:   Diagnosis Date    Hypercholesteremia     Hypertension     Stroke 06/2018      presenting with pain located left shoulder  2. Pain Generators / Etiology:  Left  shoulder postop surgical pain; rule out cervical spine pain  3. Failed Meds (E- Effective, NE- Not Effective):  Tylenol No.  3-effective, Tylenol No.  4-effective, diclofenac-effective, Mobic-on effective, ibuprofen-not effective, icy Hot-not effective  4. Physical Therapy - Recently Completed and Completed in the Past  5. Psychological comorbidities - None  6. Anticoagulants / Antiplatelets: Aspirin 81mg     PLAN:  1. Medications:  Continue gabapentin 300 mg in the morning and 600 mg in the evening; will start baclofen 10 mg twice daily as needed  2. PT - completed physical therapy greater than 6 weeks in the last 2 months  3. Psychological - none  4. Labs - obtain  none  5. Imaging - none; reviewed cervical x-ray today patient in clinic  6. Interventions - schedule none  7. Referrals - provide a referral to physiatry for EMG testing of bilateral upper extremities  8. Records - none  9. Follow up visit - follow up in clinic in 8 weeks  10. Patient Questions - answered all of the patient's questions regarding diagnosis, therapy, and treatment  11.  This condition does not require this patient to take time off of work    ESE Han MD  Interventional Pain  [facility] [place]

## 2019-07-25 NOTE — PATIENT INSTRUCTIONS
-provide a referral for EMG and appointment with physiatry  -lab baclofen 10 mg twice daily as needed  -take all medications as prescribed  -follow up in clinic in 8 weeks

## 2019-07-30 ENCOUNTER — OFFICE VISIT (OUTPATIENT)
Dept: SPORTS MEDICINE | Facility: CLINIC | Age: 44
End: 2019-07-30
Payer: COMMERCIAL

## 2019-07-30 VITALS
WEIGHT: 276 LBS | DIASTOLIC BLOOD PRESSURE: 67 MMHG | HEART RATE: 98 BPM | HEIGHT: 72 IN | SYSTOLIC BLOOD PRESSURE: 118 MMHG | BODY MASS INDEX: 37.38 KG/M2

## 2019-07-30 DIAGNOSIS — M25.512 CHRONIC LEFT SHOULDER PAIN: Primary | ICD-10-CM

## 2019-07-30 DIAGNOSIS — G89.29 CHRONIC LEFT SHOULDER PAIN: Primary | ICD-10-CM

## 2019-07-30 DIAGNOSIS — Z98.890 S/P ROTATOR CUFF REPAIR: ICD-10-CM

## 2019-07-30 PROCEDURE — 99999 PR PBB SHADOW E&M-EST. PATIENT-LVL III: ICD-10-PCS | Mod: PBBFAC,,, | Performed by: ORTHOPAEDIC SURGERY

## 2019-07-30 PROCEDURE — 99999 PR PBB SHADOW E&M-EST. PATIENT-LVL III: CPT | Mod: PBBFAC,,, | Performed by: ORTHOPAEDIC SURGERY

## 2019-07-30 PROCEDURE — 99214 OFFICE O/P EST MOD 30 MIN: CPT | Mod: S$GLB,,, | Performed by: ORTHOPAEDIC SURGERY

## 2019-07-30 PROCEDURE — 99214 PR OFFICE/OUTPT VISIT, EST, LEVL IV, 30-39 MIN: ICD-10-PCS | Mod: S$GLB,,, | Performed by: ORTHOPAEDIC SURGERY

## 2019-07-30 NOTE — PROGRESS NOTES
CC: Left shoulder post op 9 months    Juan Bennett returns to clinic for follow up evaluation.  He had a MRI without contrast left humerus completed on 7/23/19 which was unremarkable.  Patient continues to complain of subdeltoid shoulder pain.    He is also established care with pain management (Dr. Han) in [place] for his neck.  He has an EMG scheduled.    DATE OF SURGERY:11/12/2018        PREOPERATIVE DIAGNOSES:   1. Right shoulder rotator cuff tear   2. Right shoulder labral tear  3. Right shoulder AC joint arthritis     POSTOPERATIVE DIAGNOSES:   1. Right shoulder rotator cuff tear   2. Right shoulder labral tear  3. Right shoulder AC joint arthritis     PROCEDURE:   1. Right shoulder arthroscopic rotator cuff repair supraspinatus   2. Placement of a right shoulder collagen scaffold allograft augmentation on posterosuperior rotator cuff repair  3. Right shoulder arthroscopic debridement labrum  4. Right shoulder arthroscopic distal clavicle excision   5. Right shoulder arthroscopic subacromial decompression.      SURGEON: Shaji Galloway M.D.     Review of Systems   Constitution: Negative. Negative for chills, fever and night sweats.    Cardiovascular: Negative for chest pain and syncope.   Respiratory: Negative for cough and shortness of breath.   Gastrointestinal: Negative for abdominal pain and bowel incontinence.     PE:  Vitals:    07/30/19 1345   BP: 118/67   Pulse: 98   Weight: 125.2 kg (276 lb)   Height: 6' (1.829 m)   PainSc:   6     Left shoulder  Incision healed  No sign of infection  Swelling resolved  Compartments soft  Neurovascular status intact in extremity    AROM   degrees  65 degrees ER    Assessment:  Appropriate rotator cuff surgery recovery at 9 months    Plan:  1. MRI without contrast left shoulder  2. Follow up in clinic for MRI results

## 2019-08-06 ENCOUNTER — HOSPITAL ENCOUNTER (OUTPATIENT)
Dept: RADIOLOGY | Facility: HOSPITAL | Age: 44
Discharge: HOME | End: 2019-08-06
Attending: ORTHOPAEDIC SURGERY
Payer: COMMERCIAL

## 2019-08-06 DIAGNOSIS — G89.29 CHRONIC LEFT SHOULDER PAIN: ICD-10-CM

## 2019-08-06 DIAGNOSIS — Z98.890 S/P ROTATOR CUFF REPAIR: ICD-10-CM

## 2019-08-06 DIAGNOSIS — M25.512 CHRONIC LEFT SHOULDER PAIN: ICD-10-CM

## 2019-08-06 PROCEDURE — 73221 MRI JOINT UPR EXTREM W/O DYE: CPT | Mod: 26,LT,, | Performed by: RADIOLOGY

## 2019-08-06 PROCEDURE — 73221 MRI SHOULDER WITHOUT CONTRAST LEFT: ICD-10-PCS | Mod: 26,LT,, | Performed by: RADIOLOGY

## 2019-08-06 PROCEDURE — 73221 MRI JOINT UPR EXTREM W/O DYE: CPT | Mod: TC,LT

## 2019-08-13 ENCOUNTER — OFFICE VISIT (OUTPATIENT)
Dept: SPORTS MEDICINE | Facility: CLINIC | Age: 44
End: 2019-08-13
Payer: COMMERCIAL

## 2019-08-13 VITALS
DIASTOLIC BLOOD PRESSURE: 68 MMHG | BODY MASS INDEX: 37.38 KG/M2 | WEIGHT: 276 LBS | HEART RATE: 68 BPM | HEIGHT: 72 IN | SYSTOLIC BLOOD PRESSURE: 105 MMHG

## 2019-08-13 DIAGNOSIS — M25.512 CHRONIC LEFT SHOULDER PAIN: ICD-10-CM

## 2019-08-13 DIAGNOSIS — G89.29 CHRONIC LEFT SHOULDER PAIN: ICD-10-CM

## 2019-08-13 DIAGNOSIS — M75.102 NONTRAUMATIC TEAR OF LEFT ROTATOR CUFF: ICD-10-CM

## 2019-08-13 DIAGNOSIS — Z98.890 S/P ROTATOR CUFF REPAIR: Primary | ICD-10-CM

## 2019-08-13 PROCEDURE — 99999 PR PBB SHADOW E&M-EST. PATIENT-LVL IV: CPT | Mod: PBBFAC,,, | Performed by: PHYSICIAN ASSISTANT

## 2019-08-13 PROCEDURE — 99213 OFFICE O/P EST LOW 20 MIN: CPT | Mod: S$GLB,,, | Performed by: PHYSICIAN ASSISTANT

## 2019-08-13 PROCEDURE — 99213 PR OFFICE/OUTPT VISIT, EST, LEVL III, 20-29 MIN: ICD-10-PCS | Mod: S$GLB,,, | Performed by: PHYSICIAN ASSISTANT

## 2019-08-13 PROCEDURE — 99999 PR PBB SHADOW E&M-EST. PATIENT-LVL IV: ICD-10-PCS | Mod: PBBFAC,,, | Performed by: PHYSICIAN ASSISTANT

## 2019-08-13 NOTE — PROGRESS NOTES
CC: Left shoulder post op 9 months    Interval hx: Pt presents for MRI results and follow-up. He reports symptoms have not improved. +nocturnal symptoms    Previous HPI: Juan Bennett returns to clinic for follow up evaluation.  He had a MRI without contrast left humerus completed on 7/23/19 which was unremarkable.  Patient continues to complain of subdeltoid shoulder pain.    He is also established care with pain management (Dr. Han) in [place] for his neck.  He has an EMG scheduled.    DATE OF SURGERY:11/12/2018        PREOPERATIVE DIAGNOSES:   1. Left shoulder rotator cuff tear   2. Left shoulder labral tear  3. Left shoulder AC joint arthritis     POSTOPERATIVE DIAGNOSES:   1. Left shoulder rotator cuff tear   2. Left shoulder labral tear  3. Left shoulder AC joint arthritis     PROCEDURE:   1. Left shoulder arthroscopic rotator cuff repair supraspinatus   2. Placement of a right shoulder collagen scaffold allograft augmentation on posterosuperior rotator cuff repair  3. Left shoulder arthroscopic debridement labrum  4. Left shoulder arthroscopic distal clavicle excision   5. Left shoulder arthroscopic subacromial decompression.      SURGEON: Shaji Galloway M.D.     Review of Systems   Constitution: Negative. Negative for chills, fever and night sweats.    Cardiovascular: Negative for chest pain and syncope.   Respiratory: Negative for cough and shortness of breath.   Gastrointestinal: Negative for abdominal pain and bowel incontinence.     PE:  Vitals:    08/13/19 1141   BP: 105/68   Pulse: 68   Weight: 125.2 kg (276 lb)   Height: 6' (1.829 m)   PainSc:   7     Left shoulder  Incision healed  No sign of infection  Swelling resolved  Compartments soft  Neurovascular status intact in extremity    AROM   degrees  65 degrees ER    MRI SHOULDER WITHOUT CONTRAST LEFT    FINDINGS:  ROTATOR CUFF:    Supraspinatus: There is a small partial thickness rotator cuff tear involving the mid insertional fibers of  the supraspinatus tendon measuring approximately 0.9 cm.  There is additional increased signal/thickening in the infraspinatus and subscapularis insertion, suggestive of tendinosis.  Muscle volume loss noted of the supra and infraspinatus muscles without fatty atrophy.    Teres Minor:  Intact.    There is minimal physiologic  fluid within the subacromial/subdeltoid bursa.    LABRUM: Diminutive posterosuperior labrum.  Biceps-labral complex appears unremarkable.    LONG HEAD BICEPS TENDON:  Located and intact.    BONES: There are postsurgical changes of AC joint decompression.  No focal or diffuse abnormality.    CARTILAGE:  There is generalized cartilage thinning without underlying marrow edema. no  intra-articular loose bodies.    MUSCLES:  There is mild volume loss of the supraspinatus and infraspinatus muscles.      Impression       1. Small partial-thickness rotator cuff tear with tendinosis, as above.  2. Diminutive posterosuperior labrum, as above.  3.  AC joint decompression.     Assessment:    ICD-10-CM ICD-9-CM   1. S/P rotator cuff repair Z98.890 V45.89   2. Chronic left shoulder pain M25.512 719.41    G89.29 338.29   3. Nontraumatic tear of left rotator cuff M75.102 727.61         Plan:  1. MRI results reviewed today.     2. We reviewed with Juan Bennett today, the pathology and natural history of his diagnosis. We have discussed a variety of treatment options including medications, physical therapy and other alternative treatments. I also explained the indications, risks and benefits of surgery. After discussion, he decided to proceed with surgery. The decision was made to go forward with      1. Left shoulder arthroscopic revision rotator cuff repair   2. Left shoulder arthroscopic bicep tenodesis   3. Left shoulder arthroscopic debridement     Plan for surgery after EMG results scheduled September 4th.     We also discussed, given their PMHx, medically optimization and clearance from their  primary care physician.     The details of the surgical procedure were explained, including the location of probable incisions and a description of likely hardware and/or grafts to be used.  The patient understands the likely convalescence after surgery.  Also, we have thoroughly discussed the risks, benefits and alternatives to surgery, including, but not limited to, the risk of infection, joint stiffness, blood clot (including DVT and/or pulmonary embolus), neurologic and vascular injury.  It was explained that, if tissue has been repaired or reconstructed, there is a chance of failure, which may require further management.    I made the decision to obtain old records of the patient including previous notes and imaging.    3. RTC to see Yunior Blood PA-C for perioperative hx and physical.      All of the patient's questions were answered and the patient will contact us if they have any questions or concerns in the interim.

## 2019-08-15 ENCOUNTER — TELEPHONE (OUTPATIENT)
Dept: PAIN MEDICINE | Facility: CLINIC | Age: 44
End: 2019-08-15

## 2019-08-15 ENCOUNTER — PATIENT MESSAGE (OUTPATIENT)
Dept: PAIN MEDICINE | Facility: CLINIC | Age: 44
End: 2019-08-15

## 2019-08-15 NOTE — TELEPHONE ENCOUNTER
Contacted patient; all questions answered.  Patient will keep all appointments as is.        ----- Message from Sammi Garcia sent at 8/15/2019  2:03 PM CDT -----  Contact: Patient  Patient needs to talk to nurse about possibly moving his 9/19/19 appt up, he is having a surgery on 9/25/19. Please call him back at [phone]. Thank you

## 2019-08-21 ENCOUNTER — TELEPHONE (OUTPATIENT)
Dept: SPORTS MEDICINE | Facility: CLINIC | Age: 44
End: 2019-08-21

## 2019-08-21 DIAGNOSIS — M75.100 ROTATOR CUFF TEAR: Primary | ICD-10-CM

## 2019-08-21 DIAGNOSIS — M25.512 CHRONIC LEFT SHOULDER PAIN: ICD-10-CM

## 2019-08-21 DIAGNOSIS — G89.29 CHRONIC LEFT SHOULDER PAIN: ICD-10-CM

## 2019-08-21 DIAGNOSIS — M75.102 NONTRAUMATIC TEAR OF LEFT ROTATOR CUFF: Primary | ICD-10-CM

## 2019-08-21 DIAGNOSIS — M75.22 BICEPS TENDINITIS OF LEFT UPPER EXTREMITY: ICD-10-CM

## 2019-08-21 NOTE — TELEPHONE ENCOUNTER
9/30/19 left shoulder    ----- Message from Candida Lundy MA sent at 8/21/2019 10:00 AM CDT -----  Patient will do PT at [facility] ( [place] )     Pre op - 09/24/19     Date of surgery - 09/25/19

## 2019-08-23 ENCOUNTER — PATIENT MESSAGE (OUTPATIENT)
Dept: SPORTS MEDICINE | Facility: CLINIC | Age: 44
End: 2019-08-23

## 2019-08-26 ENCOUNTER — TELEPHONE (OUTPATIENT)
Dept: SPORTS MEDICINE | Facility: CLINIC | Age: 44
End: 2019-08-26

## 2019-08-26 ENCOUNTER — PATIENT MESSAGE (OUTPATIENT)
Dept: SPORTS MEDICINE | Facility: CLINIC | Age: 44
End: 2019-08-26

## 2019-08-26 DIAGNOSIS — M75.22 BICEPS TENDINITIS OF LEFT UPPER EXTREMITY: ICD-10-CM

## 2019-08-26 DIAGNOSIS — M75.102 NONTRAUMATIC TEAR OF LEFT ROTATOR CUFF: Primary | ICD-10-CM

## 2019-08-26 NOTE — TELEPHONE ENCOUNTER
Called patient to inform him that the surgery center is out of network and he may have to move surgery to [facility] for coverage.

## 2019-08-26 NOTE — TELEPHONE ENCOUNTER
----- Message from Angy Garcia sent at 8/26/2019  9:48 AM CDT -----  Morning...      This patient is scheduled on 9/25/19 and [facility] is out of network with   The patients insurance carrier......this patient needs to be referred back to their  In network provider for care...my ext is [phone].    Thank You,  Angy

## 2019-09-04 ENCOUNTER — PROCEDURE VISIT (OUTPATIENT)
Dept: NEUROLOGY | Facility: CLINIC | Age: 44
End: 2019-09-04
Payer: COMMERCIAL

## 2019-09-04 DIAGNOSIS — M25.519 SHOULDER PAIN, UNSPECIFIED CHRONICITY, UNSPECIFIED LATERALITY: Primary | ICD-10-CM

## 2019-09-04 DIAGNOSIS — M54.12 CERVICAL RADICULOPATHY: ICD-10-CM

## 2019-09-04 DIAGNOSIS — M79.603 PAIN OF UPPER EXTREMITY, UNSPECIFIED LATERALITY: ICD-10-CM

## 2019-09-04 PROCEDURE — 95912 PR NERVE CONDUCTION STUDY; 11 -12 STUDIES: ICD-10-PCS | Mod: S$GLB,,, | Performed by: PSYCHIATRY & NEUROLOGY

## 2019-09-04 PROCEDURE — 95886 PR EMG COMPLETE, W/ NERVE CONDUCTION STUDIES, 5+ MUSCLES: ICD-10-PCS | Mod: S$GLB,,, | Performed by: PSYCHIATRY & NEUROLOGY

## 2019-09-04 PROCEDURE — 95912 NRV CNDJ TEST 11-12 STUDIES: CPT | Mod: S$GLB,,, | Performed by: PSYCHIATRY & NEUROLOGY

## 2019-09-04 PROCEDURE — 95886 MUSC TEST DONE W/N TEST COMP: CPT | Mod: S$GLB,,, | Performed by: PSYCHIATRY & NEUROLOGY

## 2019-09-04 NOTE — PROCEDURES
[facility]   Delroy Ryan  Department of Neurology  [address] [place] [address] [place]  [address]  Phone [phone]     Fax  [phone]        Patient: Sabrina Martinez YOB: 1975  Patient ID: [id number] Age: 44 Years 0 Months  Sex: Male Ref. Provider: Tapan Han MD  Notes: BUE/Cervical radiculopathy      SUMMARY        Nerve conduction studies were performed in the right and left upper extremities. The right median motor study recording the abductor pollicis brevis showed a normal amplitude, normal distal latency and normal conduction velocity. The right ulnar motor study recording the abductor digiti minimi showed a normal amplitude, normal distal latency and normal conduction velocity. No conduction block or focal slowing was present across the elbow.     The right median sensory response recording digit two showed a normal amplitude, latency and conduction velocity. The right ulnar sensory response recording digit five showed a normal amplitude, latency and conduction velocity. The right radial sensory response recording over the extensor snuff box showed a normal amplitude, latency and conduction velocity.     The left median motor study recording the abductor pollicis brevis showed a normal amplitude, normal distal latency and normal conduction velocity. The left ulnar motor study recording the abductor digiti minimi showed a normal amplitude, normal distal latency and normal conduction velocity. No conduction block or focal slowing was present across the elbow.     The left median sensory response recording digit two showed a normal amplitude, latency and conduction velocity. The left ulnar sensory response recording digit five showed a normal amplitude, latency and conduction velocity. The left radial sensory response recording over the extensor snuff box showed a normal amplitude, latency and conduction velocity.                             As routine median motor and  sensory studies have a false negative rate of 25%, additional internal comparison studies were done to assess for possible median neuropathy at the wrist. These internal comparison studies (median vs. ulnar palmar mixed; median vs. ulnar sensory recording digit four; median vs. ulnar motor studies to the second lumbrical / interosseous; median vs. radial sensory studies recording digit one; median segmental sensory studies comparing the wrist-palm and palm-digit velocities) increase the electrodiagnostic sensitivity rate to 95%. However, due to statistical issues with multiple tests, it is required that at least two studies are abnormal to reduce the false positive rate to acceptable levels. Right median-ulnar mixed palmar latencies showed a normal median latency compared to the ulnar. Left median-ulnar mixed palmar latencies showed a normal median latency compared to the ulnar.         Needle EMG of the right upper extremity and cervical paraspinal muscles was normal. No denervation was seen in any muscle. All motor unit morphology, activation and recruitment patterns were normal. Needle EMG of the left upper extremity and cervical paraspinal muscles was normal. No denervation was seen in any muscle. All motor unit morphology, activation and recruitment patterns were normal.                                                                              IMPRESSION        This is a normal study.     There is no electrophysiologic evidence of median mononeuropathy across the wrist on either side. In addition, there is no electrophysiologic evidence of cervical radiculopathy, brachial plexopathy or other entrapment neuropathy in either upper extremity.      Please note: the electrodiagnosis of radiculopathy is made on the basis of excluding peripheral nerve lesions on nerve conduction studies and the needle EMG demonstrating denervation and/or reinnervation in the distribution of one or more nerve roots (i.e., acute  and/or chronic axonal loss). Thus, electrodiagnostic studies are insensitive in detecting radiculopathy in the absence of axonal loss (e.g., in the setting of compression resulting in intermittent ischemia or mechanical deformation; or demyelination without axonal loss). Thus, clinical correlation is required in the interpretation of this negative electrodiagnostic study for radiculopathy.            ---------------------------------               Adalid Farias M.D., F.A.A.N.      Diplomate, American Board of Psychiatry and Neurology  Diplomate, American Board of Clinical Neurophysiology   Fellow, American Academy of Neurology       Sensory NCS      Nerve / Sites Rec. Site Onset Peak NP Amp PP Amp Dist Edy d Lat.2     ms ms µV µV cm m/s ms   L MEDIAN - Dig II      Wrist II 2.29 2.81 11.3 18.6 14 61.1 2.81      Ref.   3.60  17.0  48.0    R MEDIAN - Dig II      Wrist II 2.24 3.13 8.2 11.1 14 62.5 3.13      Ref.   3.60  17.0  48.0    L ULNAR - Dig V      Wrist Dig V 2.55 3.23 9.2 6.1 14 54.9 3.23      Ref.   3.50  15.0  48.0    R ULNAR - Dig V      Wrist Dig V 2.34 3.23 10.2 6.2 14 59.7 3.23      Ref.   3.50  15.0  48.0    L MEDIAN - Ulnar - Palmar      Median Wrist 1.56 2.19 17.8 10.2 8 51.2 2.19      Ref.  2.20            Ulnar Wrist 1.46 2.34 14.0 3.1 8 768.0 0.16   L RADIAL - Snuff      Forearm Snuff 1.77 2.45 28.2 27.3 10 56.5 2.45   R RADIAL - Snuff      Forearm Snuff 1.88 2.50 23.4 18.5 10 53.3 2.50       Motor NCS      Nerve / Sites Rec. Site Lat Amp Dist Edy     ms mV cm m/s   L MEDIAN - APB      Wrist APB 3.13 9.4 8       Ref.  4.20 6.0        Elbow APB 7.40 9.1 23.5 55.0      Ref.     49.0   R MEDIAN - APB      Wrist APB 3.07 11.0 8       Ref.  4.20 6.0        Elbow APB 7.40 7.7 23 53.2      Ref.     49.0   L ULNAR - ADM      Wrist ADM 2.66 9.9 8       Ref.  3.20 5.0        B.Elbow ADM 6.51 9.2 21 54.5      Ref.     50.0      A.Elbow ADM 8.49 9.0 10 50.5   R ULNAR - ADM      Wrist ADM 2.60 14.1 8       Ref.   3.20 5.0        B.Elbow ADM 6.72 13.1 22 53.5      Ref.     50.0      A.Elbow ADM 8.39 12.9 11 66.0       EMG Summary Table     Spontaneous MUAP Recruitment    IA Fib PSW Fasc Other Amp Dur. PPP Pattern   L. 1ST KASIE INT C8, T1 N None None None - N N N N   R. 1ST KASIE INT C8, T1 N None None None - N N N N   L. BICEPS C5-6 N None None None - N N N N   R. BICEPS C5-6 N None None None - N N N N   L. BRACHIORADIALIS C5-6 N None None None - N N N N   R. BRACHIORADIALIS C5-6 N None None None - N N N N   L. DELTOID, C5,6 N None None None - N N N N   R. DELTOID, C5,6 N None None None - N N N N   L. FLEX CARPI RAD C6-7-8 N None None None - N N N N   R. FLEX CARPI RAD C6-7-8 N None None None - N N N N   L. TRICEPS C6-7-8 N None None None - N N N N   R. TRICEPS C6-7-8 N None None None - N N N N   L. PRONATOR TERES C6-7 N None None None - N N N N   R. PRONATOR TERES C6-7 N None None None - N N N N   L. CERV PSP (L) N None None None - N N N N   R. CERV PSP (L) N None None None - N N N N   L. CERV PSP (M) N None None None - N N N N   R. CERV PSP (M) N None None None - N N N N   L. CERV PSP (U) N None None None - N N N N   R. CERV PSP (U) N None None None - N N N N

## 2019-09-19 ENCOUNTER — OFFICE VISIT (OUTPATIENT)
Dept: PAIN MEDICINE | Facility: CLINIC | Age: 44
End: 2019-09-19
Payer: COMMERCIAL

## 2019-09-19 DIAGNOSIS — G89.29 CHRONIC LEFT SHOULDER PAIN: Primary | ICD-10-CM

## 2019-09-19 DIAGNOSIS — M75.102 TEAR OF LEFT ROTATOR CUFF, UNSPECIFIED TEAR EXTENT: ICD-10-CM

## 2019-09-19 DIAGNOSIS — M25.512 CHRONIC LEFT SHOULDER PAIN: Primary | ICD-10-CM

## 2019-09-19 PROCEDURE — 99999 PR PBB SHADOW E&M-EST. PATIENT-LVL II: CPT | Mod: PBBFAC,,, | Performed by: PAIN MEDICINE

## 2019-09-19 PROCEDURE — 99999 PR PBB SHADOW E&M-EST. PATIENT-LVL II: ICD-10-PCS | Mod: PBBFAC,,, | Performed by: PAIN MEDICINE

## 2019-09-19 PROCEDURE — 99214 OFFICE O/P EST MOD 30 MIN: CPT | Mod: S$GLB,,, | Performed by: PAIN MEDICINE

## 2019-09-19 PROCEDURE — 99214 PR OFFICE/OUTPT VISIT, EST, LEVL IV, 30-39 MIN: ICD-10-PCS | Mod: S$GLB,,, | Performed by: PAIN MEDICINE

## 2019-09-19 RX ORDER — BACLOFEN 10 MG/1
10 TABLET ORAL 3 TIMES DAILY PRN
Qty: 30 TABLET | Refills: 3 | Status: SHIPPED | OUTPATIENT
Start: 2019-09-19 | End: 2019-10-24

## 2019-09-19 RX ORDER — GABAPENTIN 300 MG/1
600 CAPSULE ORAL 4 TIMES DAILY
Qty: 90 CAPSULE | Refills: 3 | Status: SHIPPED | OUTPATIENT
Start: 2019-09-19 | End: 2019-10-24

## 2019-09-19 NOTE — PATIENT INSTRUCTIONS
-provide a prescription compounding cream to apply the left shoulder  -will increase gabapentin 600 mg 4 times daily  -will increase baclofen to 10 mg 3 times daily as needed  -follow up in clinic 3 months

## 2019-09-19 NOTE — PROGRESS NOTES
Chief Pain Complaint:  Shoulder Pain (left)    History of Present Illness:   Mr. Bennett returns to clinic for follow-up.  He was last seen on 7/25/19 and has since undergone EMG and MRI of the left shoulder.  The MRI showed that he does have a chair in the left rotator cuff and EMG was negative for radicular symptoms.  Today's pain is rated 8/10 and remains in the anterior left shoulder and the left biceps.  He has been taking baclofen 10 mg twice daily in addition to gabapentin 600 mg every 8 hr he finds he has to both the very helpful however he will wake up in the middle of the night with an increase in his symptoms.  He denies having numbness in the left upper extremity however he does have weakness related to the shoulder secondary to increase in pain. He denies having symptoms in his neck.  He is due to undergo surgery on October 25th on the left shoulder.    Initial HPI:  This patient is a 44 y.o. male who presents today complaining of the above noted pain/s. The patient describes the pain as follows.  Mr. Bennett is new patient clinic with complaints of left shoulder pain. He reports approximately 1 year ago he had a completely torn left rotator cuff in addition to a fracture of his left scapula which required rotator cuff surgery.  He also suffered 3 strokes last year from which she has fully recovered.  He has been in physical therapy for several months and completed greater than 6 weeks physical therapy but at the end of March 2019.  He continues to have numbness tingling and weakness in the left upper extremity and excruciating pain when he palpates the area around his left shoulder.  He denies having had any cervical spine issues in the past.  He has tried numerous medications including Mobic, ibuprofen, icy Hot which have not helped in addition to heat and ice.  LE thinks he found to be helpful thus far has been diclofenac and Tylenol No.  3 and 4.  He had injection in his left shoulder a few weeks ago  which he reports helped for approximately 2-3 days and then the effects of.  Today rates his pain as 6/10 and describes an aching sensation which is constant and then sharp from time to time which radiates into the left shoulder and down into the left biceps.  He does endorse having numbness and tingling distally into his hand which was not present prior to the surgery.  He denies symptoms in the right upper extremity.    Previous Therapy:  Medications:  Mobic, diclofenac, ibuprofen, Tylenol No.  3, Tylenol No.  4, icy Hot, gabapentin, baclofen  Injections:  Left shoulder steroid injection  Surgeries: Left Rotator Cuff Repair  Physical Therapy: Recently Completed and Completed in the Past    Past Surgical History:   Procedure Laterality Date    APPENDECTOMY      ARTHROSCOPY, SHOULDER, WITH SUBACROMIAL SPACE DECOMPRESSION Left 11/12/2018    [facility] at [facility] OR    CHOLECYSTECTOMY      ELBOW SURGERY Right     EXCISION, CLAVICLE, DISTAL Left 11/12/2018    Per[facility] by [facility] at [facility] OR    HAND SURGERY Right     MOUTH SURGERY      REPAIR, ROTATOR CUFF, ARTHROSCOPIC Left 11/12/2018    Performed by [facility] at [facility] OR    WRIST SURGERY       Imaging / Labs / Studies (reviewed on 9/19/2019):  Review of Systems:  Review of Systems   Constitutional: Negative for fever.   Eyes: Negative for blurred vision.   Respiratory: Negative for cough and wheezing.    Cardiovascular: Negative for chest pain and orthopnea.   Gastrointestinal: Negative for constipation, diarrhea, nausea and vomiting.   Genitourinary: Negative for dysuria.   Musculoskeletal: Positive for joint pain. Negative for back pain and neck pain.   Skin: Negative for itching and rash.   Neurological: Positive for weakness.   Endo/Heme/Allergies: Does not bruise/bleed easily.       Physical Exam:  There were no vitals taken for this visit. (reviewed on 9/19/2019)\  General    Constitutional: He is  oriented to person, place, and time. He appears well-developed and well-nourished.   HENT:   Head: Normocephalic and atraumatic.   Eyes: EOM are normal.   Neck: Neck supple.   Pulmonary/Chest: Effort normal.   Abdominal: He exhibits no distension.   Neurological: He is alert and oriented to person, place, and time. No cranial nerve deficit.   Psychiatric: He has a normal mood and affect.               Assessment  Postop surgical pain  Left shoulder pain    1. 44 y.o. year old patient with PMH of   Past Medical History:   Diagnosis Date    Hypercholesteremia     Hypertension     Stroke 06/2018      presenting with pain located left shoulder  2. Pain Generators / Etiology:  Left shoulder postop surgical pain; rule out cervical spine pain  3. Failed Meds (E- Effective, NE- Not Effective):  Tylenol No.  3-effective, Tylenol No.  4-effective, diclofenac-effective, Mobic-on effective, ibuprofen-not effective, icy Hot-not effective  4. Physical Therapy - Recently Completed and Completed in the Past  5. Psychological comorbidities - None  6. Anticoagulants / Antiplatelets: Aspirin 81mg     PLAN:  1. Medications:  Will increase gabapentin to 600 mg Q 6 hr; also increase baclofen to 10 mg 3 times daily as needed; provided prescription for compounding cream to apply the left shoulder  2. PT - completed physical therapy greater than 6 weeks in the last 2 months  3. Psychological - none  4. Labs - obtain  none  5. Imaging - none;  6. Interventions - schedule none  7. Referrals - none  8. Records - none  9. Follow up visit - follow up in clinic in 3 months  10. Patient Questions - answered all of the patient's questions regarding diagnosis, therapy, and treatment  11.  This condition does not require this patient to take time off of work    ESE Han MD  Interventional Pain  [facility] [place]

## 2019-09-20 ENCOUNTER — TELEPHONE (OUTPATIENT)
Dept: PAIN MEDICINE | Facility: CLINIC | Age: 44
End: 2019-09-20

## 2019-09-20 NOTE — TELEPHONE ENCOUNTER
----- Message from Yodit Garcia sent at 9/20/2019  3:26 PM CDT -----  Contact: self  needs call back to clarify gabapentin directions..[phone]

## 2019-10-04 ENCOUNTER — TELEPHONE (OUTPATIENT)
Dept: PAIN MEDICINE | Facility: CLINIC | Age: 44
End: 2019-10-04

## 2019-10-04 NOTE — TELEPHONE ENCOUNTER
Contacted patient; patient had questions regarding directions on baclofen and gabapentin.  Reached out to patient's pharmacy; spoke to Janet at [facility]. Orders changed with correct dispense amount.        ----- Message from Wendy Candelario sent at 10/4/2019 10:39 AM CDT -----  Contact: [phone]  Would like to consult with the nurse, Patient was seen in the Office and his Medication was change, the direction on  the Qunity dose no match the direction  Patient would like to speak with the nurse concerning this, Patient would like to speak with the nurse concerning this, Please call back at [phone], Thanks sj

## 2019-10-16 ENCOUNTER — OFFICE VISIT (OUTPATIENT)
Dept: SPORTS MEDICINE | Facility: CLINIC | Age: 44
End: 2019-10-16
Payer: COMMERCIAL

## 2019-10-16 VITALS
WEIGHT: 276 LBS | SYSTOLIC BLOOD PRESSURE: 141 MMHG | HEART RATE: 66 BPM | DIASTOLIC BLOOD PRESSURE: 91 MMHG | HEIGHT: 72 IN | BODY MASS INDEX: 37.38 KG/M2

## 2019-10-16 DIAGNOSIS — M75.22 BICEPS TENDINITIS OF LEFT UPPER EXTREMITY: ICD-10-CM

## 2019-10-16 DIAGNOSIS — M75.102 NONTRAUMATIC TEAR OF LEFT ROTATOR CUFF, UNSPECIFIED TEAR EXTENT: Primary | ICD-10-CM

## 2019-10-16 PROCEDURE — 99999 PR PBB SHADOW E&M-EST. PATIENT-LVL V: ICD-10-PCS | Mod: PBBFAC,,, | Performed by: PHYSICIAN ASSISTANT

## 2019-10-16 PROCEDURE — 99999 PR PBB SHADOW E&M-EST. PATIENT-LVL V: CPT | Mod: PBBFAC,,, | Performed by: PHYSICIAN ASSISTANT

## 2019-10-16 PROCEDURE — 99499 NO LOS: ICD-10-PCS | Mod: S$GLB,,, | Performed by: PHYSICIAN ASSISTANT

## 2019-10-16 PROCEDURE — 99499 UNLISTED E&M SERVICE: CPT | Mod: S$GLB,,, | Performed by: PHYSICIAN ASSISTANT

## 2019-10-16 RX ORDER — ASPIRIN 325 MG
325 TABLET ORAL EVERY 12 HOURS
Qty: 42 TABLET | Refills: 0 | COMMUNITY
Start: 2019-10-16

## 2019-10-16 RX ORDER — PROMETHAZINE HYDROCHLORIDE 25 MG/1
25 TABLET ORAL EVERY 4 HOURS PRN
Qty: 30 TABLET | Refills: 0 | Status: SHIPPED | OUTPATIENT
Start: 2019-10-16 | End: 2019-10-31 | Stop reason: SDUPTHER

## 2019-10-16 RX ORDER — SODIUM CHLORIDE 0.9 % (FLUSH) 0.9 %
10 SYRINGE (ML) INJECTION
Status: CANCELLED | OUTPATIENT
Start: 2019-10-16

## 2019-10-16 RX ORDER — TRAMADOL HYDROCHLORIDE 50 MG/1
50 TABLET ORAL EVERY 6 HOURS PRN
Qty: 28 TABLET | Refills: 0 | Status: SHIPPED | OUTPATIENT
Start: 2019-10-16 | End: 2019-10-31 | Stop reason: SDUPTHER

## 2019-10-16 RX ORDER — HYDROCODONE BITARTRATE 10 MG/1
10 CAPSULE, EXTENDED RELEASE ORAL 2 TIMES DAILY PRN
Qty: 28 EACH | Refills: 0 | Status: SHIPPED | OUTPATIENT
Start: 2019-10-16 | End: 2019-10-22

## 2019-10-16 RX ORDER — MUPIROCIN 20 MG/G
OINTMENT TOPICAL
Status: CANCELLED | OUTPATIENT
Start: 2019-10-16

## 2019-10-16 NOTE — H&P
Juan Bennett  is here for a completion of his perioperative paperwork. he  Is scheduled to undergo 1. Left shoulder arthroscopic revision rotator cuff repair   2. Left shoulder arthroscopic bicep tenodesis   3. Left shoulder arthroscopic debridement on 10/25/2019.      He  does need clearance for this procedure.     Per PCP, patient is cleared for surgery, scanned under media tab    Risks, indications and benefits of the surgical procedure were discussed with the patient. All questions with regard to surgery, rehab, expected return to functional activities, activities of daily living and recreational endeavors were answered to his satisfaction.    Patient was informed and understands the risks of surgery are greater for patients with a current condition or history of heart disease, obesity, clotting disorders, recurrent infections, steroid use, current or past smoking, and factors such as sedentary lifestyle and noncompliance with medications, therapy or follow-up. The degree of the increased risk is hard to estimate with any degree of precision.    Once no other questions were asked, a brief history and physical exam was then performed.    PAST MEDICAL HISTORY:   Past Medical History:   Diagnosis Date    Hypercholesteremia     Hypertension     Stroke 06/2018     PAST SURGICAL HISTORY:   Past Surgical History:   Procedure Laterality Date    APPENDECTOMY      ARTHROSCOPIC REPAIR OF ROTATOR CUFF OF SHOULDER Left 11/12/2018    Procedure: REPAIR, ROTATOR CUFF, ARTHROSCOPIC;  Surgeon: Shaji Galloway MD;  Location: [facility] OR;  Service: Orthopedics;  Laterality: Left;  regional w/o catheter     ARTHROSCOPY OF SHOULDER WITH DECOMPRESSION OF SUBACROMIAL SPACE Left 11/12/2018    Procedure: ARTHROSCOPY, SHOULDER, WITH SUBACROMIAL SPACE DECOMPRESSION;  Surgeon: Shaji Galloway MD;  Location: [facility] OR;  Service: Orthopedics;  Laterality: Left;    CHOLECYSTECTOMY      DISTAL CLAVICLE EXCISION Left 11/12/2018     Procedure: EXCISION, CLAVICLE, DISTAL;  Surgeon: Shaji Galloway MD;  Location: [facility];  Service: Orthopedics;  Laterality: Left;    ELBOW SURGERY Right     HAND SURGERY Right     MOUTH SURGERY      WRIST SURGERY       FAMILY HISTORY:   Family History   Problem Relation Age of Onset    Heart disease Mother     Lung cancer Mother     Diabetes Mother     Heart disease Father     Bladder Cancer Father     Diabetes Father      SOCIAL HISTORY:   Social History     Socioeconomic History    Marital status:      Spouse name: Not on file    Number of children: Not on file    Years of education: Not on file    Highest education level: Not on file   Occupational History    Not on file   Social Needs    Financial resource strain: Not on file    Food insecurity:     Worry: Not on file     Inability: Not on file    Transportation needs:     Medical: Not on file     Non-medical: Not on file   Tobacco Use    Smoking status: Light Tobacco Smoker     Types: Cigarettes    Smokeless tobacco: Current User     Types: Chew    Tobacco comment: 1 cigarrette every 2 weeks   Substance and Sexual Activity    Alcohol use: No     Frequency: Never    Drug use: Not on file    Sexual activity: Not on file   Lifestyle    Physical activity:     Days per week: Not on file     Minutes per session: Not on file    Stress: Not on file   Relationships    Social connections:     Talks on phone: Not on file     Gets together: Not on file     Attends Gnosticist service: Not on file     Active member of club or organization: Not on file     Attends meetings of clubs or organizations: Not on file     Relationship status: Not on file   Other Topics Concern    Not on file   Social History Narrative    Not on file       MEDICATIONS:   Current Outpatient Medications:     acetaminophen-codeine 300-30mg (TYLENOL #3) 300-30 mg Tab, Take by mouth., Disp: , Rfl:     AMLODIPINE BESYLATE, BULK, MISC, 10 mg by Misc.(Non-Drug;  Combo Route) route once daily. , Disp: , Rfl:     aspirin (ASPIR-81 ORAL), Take by mouth once daily. Instructed to stop on 11/8/18 for surgery on 11/12/18 per Dr. Galloway, Disp: , Rfl:     baclofen (LIORESAL) 10 MG tablet, Take 1 tablet (10 mg total) by mouth 3 (three) times daily as needed., Disp: 30 tablet, Rfl: 3    busPIRone (BUSPAR) 15 MG tablet, Take 15 mg by mouth once daily., Disp: , Rfl:     celecoxib (CELEBREX) 200 MG capsule, , Disp: , Rfl: 3    cloNIDine (CATAPRES) 0.1 MG tablet, Take 0.1 mg by mouth daily as needed. , Disp: , Rfl:     diclofenac potassium (CAMBIA) 50 mg PwPk, Take by mouth once daily. , Disp: , Rfl:     escitalopram oxalate (LEXAPRO) 10 MG tablet, Take 10 mg by mouth once daily., Disp: , Rfl:     FAMOTIDINE ORAL, 20 mg 3 (three) times daily. , Disp: , Rfl:     gabapentin (NEURONTIN) 300 MG capsule, Take 2 capsules (600 mg total) by mouth 4 (four) times daily. Can take 3 caps qhs or tid if tolerated, may cause drowsiness, Disp: 90 capsule, Rfl: 3    hydrALAZINE (APRESOLINE) 50 MG tablet, Take 50 mg by mouth 2 (two) times daily. , Disp: , Rfl:     hydroCHLOROthiazide (HYDRODIURIL) 25 MG tablet, Take 25 mg by mouth once daily., Disp: , Rfl:     HYDROcodone bitartrate 10 mg CR12, Take 10 mg by mouth every 12 (twelve) hours., Disp: 30 each, Rfl: 0    ibuprofen (ADVIL,MOTRIN) 800 MG tablet, Take 800 mg by mouth 3 (three) times daily., Disp: , Rfl:     lisinopril (PRINIVIL,ZESTRIL) 20 MG tablet, Take 40 mg by mouth once daily. , Disp: , Rfl:     meloxicam (MOBIC) 15 MG tablet, Take 1 tablet (15 mg total) by mouth once daily., Disp: 30 tablet, Rfl: 2    metFORMIN (GLUCOPHAGE) 1000 MG tablet, Take 1,000 mg by mouth 2 (two) times daily., Disp: , Rfl:     promethazine (PHENERGAN) 25 MG tablet, Take 1 tablet (25 mg total) by mouth every 6 (six) hours as needed for Nausea., Disp: 30 tablet, Rfl: 0    rosuvastatin (CRESTOR) 20 MG tablet, Take 20 mg by mouth once daily., Disp: ,  Rfl:     traZODone (DESYREL) 100 MG tablet, Take 100 mg by mouth once daily., Disp: , Rfl:   ALLERGIES:   Review of patient's allergies indicates:   Allergen Reactions    Albuterol Hives    Morphine Hives    Pcn [penicillins] Hives    Xopenex [levalbuterol hcl] Hives    Percocet  [oxycodone-acetaminophen] Anxiety, Hallucinations, Hives, Itching, Nausea And Vomiting and Other (See Comments)       Review of Systems   Constitution: Negative. Negative for chills, fever and night sweats.   HENT: Negative for congestion and headaches.    Eyes: Negative for blurred vision, left vision loss and right vision loss.   Cardiovascular: Negative for chest pain and syncope.   Respiratory: Negative for cough and shortness of breath.    Endocrine: Negative for polydipsia, polyphagia and polyuria.   Hematologic/Lymphatic: Negative for bleeding problem. Does not bruise/bleed easily.   Skin: Negative for dry skin, itching and rash.   Musculoskeletal: Negative for falls and muscle weakness.   Gastrointestinal: Negative for abdominal pain and bowel incontinence.   Genitourinary: Negative for bladder incontinence and nocturia.   Neurological: Negative for disturbances in coordination, loss of balance and seizures.   Psychiatric/Behavioral: Negative for depression. The patient does not have insomnia.    Allergic/Immunologic: Negative for hives and persistent infections.     PHYSICAL EXAM:  GEN: A&Ox3, WD WN NAD  HEENT: WNL  CHEST: CTAB, no W/R/R  HEART: RRR, no M/R/G   ABD: Soft, NT ND, BS x4 QUADS  MS: Refer to previous note for detailed MS exam  NEURO: CN II-XII intact       The surgical consent was then reviewed with the patient, who agreed with all the contents of the consent form and it was signed. he was then given the [facility] surgery packet to bring with him to [facility] for the anesthesia portion of his perioperative paperwork.     PHYSICAL THERAPY:  He was also instructed regarding physical therapy and will begin POD # 1-3. He  is doing physical therapy at [facility].      POST OP CARE:instructions were reviewed including care of the wound and dressing after surgery and when he can shower.     PAIN MANAGEMENT: Juan Bennett was also given a pain management regime, which includes the TENS unit given to him by Jono Husain along with the education required for its use. He was also instructed regarding the Polar ice unit that will be in place after surgery and his postoperative pain medications.     PAIN MEDICATION:  Hydrocodone Bitartrate 10mg  CR12   po q 12 hours prn pain  Ultram 50 mg one p.o. q.4-6 hours p.r.n. breakthrough pain,   Phenergan 25 mg one p.o. q.4-6 hours p.r.n. nausea and vomiting.  Aspirin 325mg BID x 3 weeks for DVT prophylaxis starting on the evening after surgery.    Patient was not able to get post op medications at their pharmacy. Patient was instructed to  medications at [facility] in the atrium prior to surgery. If patient is unable, family will  meds during surgery. Pharmacy notified of this plan.    Patient denies history of seizures.     Patient will also use bilateral TEDs on lower extremities, SCDs during surgery, and early ambulation post-op. If the patient was previously taking 81mg baby aspirin, they were told to not take it will using the above stated aspirin and to restart the 81mg aspirin after completion of the aspirin dose.       Patient was also told to buy over the counter Prilosec medication and take it once daily for GI protection as long as they are taking NSAIDs or Aspirin.    DVT prophylaxis was discussed with the patient today including risk factors for developing DVTs and history of DVTs. The patient was asked if any specific recommendations were given from the doctor/s that did pre-operative surgical clearance.      Patient was asked if they were taking or using OCP pills or devices. If they answered yes, then they were instructed to  stop using OCPs at this pre-operative appointment until 2 months post-op to help prevent DVT development. They understand that there are other forms of birth control that do not involve hormones. They expressed understanding that ignoring/not following this instruction could result in a DVT which could turn into a deadly pulmonary embolism.      The patient was told that narcotic pain medications may make them drowsy and instructions were given to not sign legal documents, drive or operate heavy machinery, cars, or equipment while under the influence of narcotic medications.     As there were no other questions to be asked, he was given my business card along with Shaji Galloway MD business card if he has any questions or concerns prior to surgery or in the postop period.

## 2019-10-22 DIAGNOSIS — M75.100 ROTATOR CUFF TEAR: Primary | ICD-10-CM

## 2019-10-22 RX ORDER — HYDROCODONE BITARTRATE AND ACETAMINOPHEN 7.5; 325 MG/1; MG/1
1 TABLET ORAL EVERY 6 HOURS PRN
Qty: 28 TABLET | Refills: 0 | Status: SHIPPED | OUTPATIENT
Start: 2019-10-22 | End: 2019-10-22

## 2019-10-22 RX ORDER — HYDROCODONE BITARTRATE AND ACETAMINOPHEN 7.5; 325 MG/1; MG/1
1 TABLET ORAL EVERY 6 HOURS PRN
Qty: 28 TABLET | Refills: 0 | Status: SHIPPED | OUTPATIENT
Start: 2019-10-22 | End: 2020-01-06

## 2019-10-22 NOTE — TELEPHONE ENCOUNTER
----- Message from Nasir Rowe PharmD sent at 10/22/2019  2:18 PM CDT -----  Regarding: Zohydro ER  Good afternoon! Mr. Bennett's insurance does not cover Zohydro ER and the medication is $404 without insurance. We have been trying to contact the patient to see if this was something that he wanted/could afford, but we have not been able to reach the patient. We tried to see if Hysingla ER was covered with his insurance, but unfortunately that was not covered either.  Is there any other medication you would like to send over, so that we can have the medication ready for delivery on 10/25? Any questions please call [facility] @ [phone].    Thank you,   Nasir Salcedo, JayleenD

## 2019-10-22 NOTE — TELEPHONE ENCOUNTER
Dr. Galloway reviewed message from pharmacy.  Will cancel Zohydro ER due to cost.  Dr. Galloway will send Ruby  to pharmacy.

## 2019-10-24 ENCOUNTER — TELEPHONE (OUTPATIENT)
Dept: SPORTS MEDICINE | Facility: CLINIC | Age: 44
End: 2019-10-24

## 2019-10-25 DIAGNOSIS — G89.29 CHRONIC LEFT SHOULDER PAIN: Primary | ICD-10-CM

## 2019-10-25 DIAGNOSIS — M25.512 CHRONIC LEFT SHOULDER PAIN: Primary | ICD-10-CM

## 2019-10-25 DIAGNOSIS — M75.100 ROTATOR CUFF TEAR: ICD-10-CM

## 2019-10-25 PROBLEM — M75.22 BICEPS TENDINITIS OF LEFT UPPER EXTREMITY: Status: ACTIVE | Noted: 2019-10-25

## 2019-10-25 RX ORDER — ACETAMINOPHEN AND CODEINE PHOSPHATE 300; 60 MG/1; MG/1
1 TABLET ORAL EVERY 6 HOURS PRN
Qty: 28 TABLET | Refills: 0 | Status: SHIPPED | OUTPATIENT
Start: 2019-10-25 | End: 2019-10-31 | Stop reason: SDUPTHER

## 2019-10-25 RX ORDER — ACETAMINOPHEN AND CODEINE PHOSPHATE 300; 30 MG/1; MG/1
1 TABLET ORAL EVERY 6 HOURS PRN
Qty: 28 TABLET | Refills: 0 | Status: CANCELLED | OUTPATIENT
Start: 2019-10-25 | End: 2019-11-04

## 2019-10-25 NOTE — TELEPHONE ENCOUNTER
Patient reports not having good relief from norco or tramadol in past.  Had a bad experience with Percocet in the past (depression).  Reports good relief from Tylenol 3 in the past.   Prednisone 10mg x 1 week  Prednisone 7.5mg x 1 week  Prednisone 5mg x 1 week  Prednisone 2.5mg x 1 week

## 2019-10-31 DIAGNOSIS — G89.29 CHRONIC LEFT SHOULDER PAIN: ICD-10-CM

## 2019-10-31 DIAGNOSIS — M25.512 CHRONIC LEFT SHOULDER PAIN: ICD-10-CM

## 2019-10-31 DIAGNOSIS — M75.100 ROTATOR CUFF TEAR: ICD-10-CM

## 2019-10-31 DIAGNOSIS — M75.102 NONTRAUMATIC TEAR OF LEFT ROTATOR CUFF, UNSPECIFIED TEAR EXTENT: ICD-10-CM

## 2019-10-31 DIAGNOSIS — M75.22 BICEPS TENDINITIS OF LEFT UPPER EXTREMITY: ICD-10-CM

## 2019-11-01 RX ORDER — PROMETHAZINE HYDROCHLORIDE 25 MG/1
25 TABLET ORAL EVERY 4 HOURS PRN
Qty: 30 TABLET | Refills: 0 | Status: SHIPPED | OUTPATIENT
Start: 2019-11-01 | End: 2019-11-13 | Stop reason: SDUPTHER

## 2019-11-01 RX ORDER — ACETAMINOPHEN AND CODEINE PHOSPHATE 300; 60 MG/1; MG/1
1 TABLET ORAL EVERY 6 HOURS PRN
Qty: 28 TABLET | Refills: 0 | Status: SHIPPED | OUTPATIENT
Start: 2019-11-01 | End: 2019-11-07 | Stop reason: SDUPTHER

## 2019-11-01 RX ORDER — TRAMADOL HYDROCHLORIDE 50 MG/1
50 TABLET ORAL EVERY 6 HOURS PRN
Qty: 28 TABLET | Refills: 0 | Status: SHIPPED | OUTPATIENT
Start: 2019-11-01 | End: 2019-11-08

## 2019-11-06 ENCOUNTER — HOSPITAL ENCOUNTER (EMERGENCY)
Facility: HOSPITAL | Age: 44
Discharge: HOME | End: 2019-11-06
Attending: EMERGENCY MEDICINE
Payer: COMMERCIAL

## 2019-11-06 VITALS
SYSTOLIC BLOOD PRESSURE: 136 MMHG | OXYGEN SATURATION: 96 % | BODY MASS INDEX: 36.46 KG/M2 | HEIGHT: 72 IN | TEMPERATURE: 98 F | WEIGHT: 269.19 LBS | RESPIRATION RATE: 18 BRPM | DIASTOLIC BLOOD PRESSURE: 86 MMHG | HEART RATE: 71 BPM
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
## 2019-11-06 DIAGNOSIS — S49.92XA INJURY OF LEFT UPPER ARM, INITIAL ENCOUNTER: ICD-10-CM

## 2019-11-06 DIAGNOSIS — M79.602 LEFT ARM PAIN: ICD-10-CM

## 2019-11-06 PROCEDURE — 99283 EMERGENCY DEPT VISIT LOW MDM: CPT | Mod: 25

## 2019-11-07 DIAGNOSIS — M75.100 ROTATOR CUFF TEAR: ICD-10-CM

## 2019-11-07 DIAGNOSIS — M25.512 CHRONIC LEFT SHOULDER PAIN: ICD-10-CM

## 2019-11-07 DIAGNOSIS — G89.29 CHRONIC LEFT SHOULDER PAIN: ICD-10-CM

## 2019-11-07 RX ORDER — ACETAMINOPHEN AND CODEINE PHOSPHATE 300; 60 MG/1; MG/1
1 TABLET ORAL EVERY 6 HOURS PRN
Qty: 28 TABLET | Refills: 0 | Status: SHIPPED | OUTPATIENT
Start: 2019-11-07 | End: 2019-11-13 | Stop reason: SDUPTHER

## 2019-11-07 NOTE — ED PROVIDER NOTES
"SCRIBE #1 NOTE: I, Peggy Jimenes, am scribing for, and in the presence of, Terrance Esquivel NP. I have scribed the entire note.       History     Chief Complaint   Patient presents with    Fall     surgery to left arm on 25th of october, fell in shower today and felt a pop. Pt states that he has increased pain to left arm. Denies LOC      Review of patient's allergies indicates:   Allergen Reactions    Albuterol Hives    Morphine Hives    Pcn [penicillins] Hives    Xopenex [levalbuterol hcl] Hives    Percocet  [oxycodone-acetaminophen] Anxiety, Hallucinations, Hives, Itching, Nausea And Vomiting and Other (See Comments)         History of Present Illness     HPI    11/6/2019, 7:35 PM  History obtained from the patient      History of Present Illness: Juan Bennett is a 44 y.o. male patient with a PMHx of HTN, HLD, and CVA who presents to the Emergency Department for evaluation of a mechanical fall which onset suddenly today. Pt reports that he had surgery at [facility] by Dr. Galloway on his left arm on 10/25/19. Pt states that he slipped in the bathroom and landed on the left arm causing a "pop" sound. Symptoms are constant and moderate in severity. Movement increases pain. Associated sxs include LUE pain and LUE swelling. Patient denies any head injury, LOC, other injuries, congestion, sore throat, fever/ chills, neck/ back pain, n/v/d, abdominal pain, dysuria, hematuria, CP, palpations, cough, SOB, numbness, HA, dizziness, wound, rash, and all other sxs at this time. Prior Tx includes surgery of the left arm. No further complaints or concerns at this time.     Arrival mode: Personal vehicle     PCP: Primary Doctor No        Past Medical History:  Past Medical History:   Diagnosis Date    Hypercholesteremia     Hypertension     Stroke 06/2018       Past Surgical History:  Past Surgical History:   Procedure Laterality Date    APPENDECTOMY      ARTHROSCOPIC DEBRIDEMENT OF SHOULDER  " 10/25/2019    Procedure: DEBRIDEMENT, SHOULDER, ARTHROSCOPIC;  Surgeon: Shaji Galloway MD;  Location: [facility] OR;  Service: Orthopedics;;  labrum    ARTHROSCOPIC REPAIR OF ROTATOR CUFF OF SHOULDER Left 11/12/2018    Procedure: REPAIR, ROTATOR CUFF, ARTHROSCOPIC;  Surgeon: Shaji Galloway MD;  Location: [facility] OR;  Service: Orthopedics;  Laterality: Left;  regional w/o catheter     ARTHROSCOPY OF SHOULDER WITH DECOMPRESSION OF SUBACROMIAL SPACE Left 11/12/2018    Procedure: ARTHROSCOPY, SHOULDER, WITH SUBACROMIAL SPACE DECOMPRESSION;  Surgeon: Shaji Galloway MD;  Location: [facility] OR;  Service: Orthopedics;  Laterality: Left;    ARTHROSCOPY OF SHOULDER WITH DECOMPRESSION OF SUBACROMIAL SPACE Left 10/25/2019    Procedure: ARTHROSCOPY, SHOULDER, WITH SUBACROMIAL SPACE DECOMPRESSION;  Surgeon: Shaji Galloway MD;  Location: [facility] OR;  Service: Orthopedics;  Laterality: Left;    BICEPS TENDON REPAIR Left 10/25/2019    Procedure: REPAIR, TENDON, BICEPS;  Surgeon: Shaji Galloway MD;  Location: [facility] OR;  Service: Orthopedics;  Laterality: Left;    CHOLECYSTECTOMY      DISTAL CLAVICLE EXCISION Left 11/12/2018    Procedure: EXCISION, CLAVICLE, DISTAL;  Surgeon: Shaji Galloway MD;  Location: [facility] OR;  Service: Orthopedics;  Laterality: Left;    ELBOW SURGERY Right     HAND SURGERY Right     HARDWARE REMOVAL Left 10/25/2019    Procedure: REMOVAL, HARDWARE;  Surgeon: Shaji Galloway MD;  Location: [facility] OR;  Service: Orthopedics;  Laterality: Left;    MOUTH SURGERY      WRIST SURGERY           Family History:  Family History   Problem Relation Age of Onset    Heart disease Mother     Lung cancer Mother     Diabetes Mother     Heart disease Father     Bladder Cancer Father     Diabetes Father        Social History:  Social History     Tobacco Use    Smoking status: Light Tobacco Smoker     Types: Cigarettes    Smokeless tobacco: Current User     Types: Chew    Tobacco comment: 1  cigarrette every 2 weeks   Substance and Sexual Activity    Alcohol use: No     Frequency: Never    Drug use: Unknown    Sexual activity: Unknown        Review of Systems     Review of Systems   Constitutional: Negative for chills and fever.   HENT: Negative for congestion and sore throat.    Respiratory: Negative for cough and shortness of breath.    Cardiovascular: Negative for chest pain and palpitations.   Gastrointestinal: Negative for abdominal pain, diarrhea, nausea and vomiting.   Genitourinary: Negative for dysuria and hematuria.   Musculoskeletal: Positive for arthralgias (L arm) and joint swelling (L arm). Negative for back pain and neck pain.        + Mechanical fall  - Other injuries   Skin: Negative for rash and wound.   Neurological: Negative for dizziness, numbness and headaches.        - LOC  - head injury   All other systems reviewed and are negative.     Physical Exam     Initial Vitals [11/06/19 1915]   BP Pulse Resp Temp SpO2   136/86 71 18 98.1 °F (36.7 °C) 96 %      MAP       --          Physical Exam  Nursing Notes and Vital Signs Reviewed.  Constitutional: Patient is in no acute distress. Well-developed and well-nourished.  Head: Atraumatic. Normocephalic.  Eyes: PERRL. EOM intact. Conjunctivae are not pale. No scleral icterus.  ENT: Mucous membranes are moist. Oropharynx is clear and symmetric.    Neck: Supple. Full ROM. No lymphadenopathy.  Cardiovascular: Regular rate. Regular rhythm. No murmurs, rubs, or gallops. Distal pulses are 2+ and symmetric.  Pulmonary/Chest: No respiratory distress. Clear to auscultation bilaterally. No wheezing or rales.  Abdominal: Soft and non-distended.  There is no tenderness.  No rebound, guarding, or rigidity. Good bowel sounds.  Genitourinary: No CVA tenderness  Musculoskeletal: Moves all extremities. No obvious deformities. No edema. No calf tenderness.   LLE: No evident deformity. Positive for tenderness of the left upper arm. Cap refill distally is  <2 seconds. DP and PT pulses are equal and 2+ bilaterally. No motor deficit. No distal sensory deficit.  Skin: Warm and dry. Surgical wound to the left anterior shoulder that is healing well with no signs of infection.   Neurological:  Alert, awake, and appropriate.  Normal speech.  No acute focal neurological deficits are appreciated.  Psychiatric: Normal affect. Good eye contact. Appropriate in content.     ED Course   Procedures  ED Vital Signs:  Vitals:    11/06/19 1915   BP: 136/86   Pulse: 71   Resp: 18   Temp: 98.1 °F (36.7 °C)   TempSrc: Oral   SpO2: 96%   Weight: 122.1 kg (269 lb 2.9 oz)   Height: 6' (1.829 m)       Abnormal Lab Results:  Labs Reviewed - No data to display     All Lab Results:  None.     Imaging Results:  Imaging Results          X-Ray Humerus 2 View Left (Final result)  Result time 11/06/19 19:55:52    Final result by Placido Mauricio MD (11/06/19 19:55:52)                 Impression:      Negative for fracture.  Small lucent lesion proximal humerus as noted.      Electronically signed by: Placido Mauricio MD  Date:    11/06/2019  Time:    19:55             Narrative:    EXAMINATION:  XR HUMERUS 2 VIEW LEFT    CLINICAL HISTORY:  Unspecified fall, initial encounter    TECHNIQUE:  Routine radiographs obtained.    COMPARISON:  None    FINDINGS:  Negative for acute fracture or dislocation.    No significant arthritic changes.    There is a small circumscribed lucent lesion within the proximal diaphysis of the humerus measuring 9 mm.  This is indeterminate.  Marrow infiltrative process such as metastatic disease or myeloma should be excluded..                               X-Ray Shoulder Trauma Left (Final result)  Result time 11/06/19 19:57:15    Final result by Placido Ortega Jr., MD (11/06/19 19:57:15)                 Impression:      1.  No acute fracture or dislocation left shoulder.  2.  7 mm round lucency in the proximal humeral diaphysis may be related to a prior procedure.  Correlate with  history.      Electronically signed by: Placido Ortega Jr., MD  Date:    11/06/2019  Time:    19:57             Narrative:    EXAMINATION:  XR SHOULDER TRAUMA 3 VIEW LEFT    CLINICAL HISTORY:  Unspecified fall, initial encounter    COMPARISON:  10/16/2018    FINDINGS:  Acromioclavicular and glenohumeral joint spaces are within normal limits.  There is a small round lucency that measures 7 mm in the proximal humeral diaphysis not seen on the previous exam and may be related to a prior procedure.  No acute fracture or dislocation.  Left lung is clear.                                        The Emergency Provider reviewed the vital signs and test results, which are outlined above.     ED Discussion     8:20 PM: Reassessed pt at this time. Discussed with pt all pertinent ED information and results. Discussed pt dx and plan of tx. Gave pt all f/u and return to the ED instructions. All questions and concerns were addressed at this time. Pt expresses understanding of information and instructions, and is comfortable with plan to discharge. Pt is stable for discharge.    I discussed with patient and/or family/caretaker that evaluation in the ED does not suggest any emergent or life threatening medical conditions requiring immediate intervention beyond what was provided in the ED, and I believe patient is safe for discharge.  Regardless, an unremarkable evaluation in the ED does not preclude the development or presence of a serious of life threatening condition. As such, patient was instructed to return immediately for any worsening or change in current symptoms.       Medical Decision Making:   Clinical Tests:   Radiological Study: Ordered and Reviewed           ED Medication(s):  Medications - No data to display    Discharge Medication List as of 11/6/2019  8:20 PM          Follow-up Information     Schedule an appointment as soon as possible for a visit  with Shaji Galloway MD.    Specialties:  Sports Medicine,  Orthopedic Surgery  Contact information:  [address]  [facility] [address]  [phone]             [facility] - .    Specialty:  Emergency Medicine  Why:  As needed, If symptoms worsen  Contact information:  [address]  [facility] [address] [phone]                     Scribe Attestation:   Scribe #1: I performed the above scribed service and the documentation accurately describes the services I performed. I attest to the accuracy of the note.     Attending:   Physician Attestation Statement for Scribe #1: I, Terrance Esquivel NP, personally performed the services described in this documentation, as scribed by Peggy Jimenes, in my presence, and it is both accurate and complete.           Clinical Impression       ICD-10-CM ICD-9-CM   1. Fall W19.XXXA E888.9   2. Injury of left upper arm, initial encounter S49.92XA 959.2   3. Left arm pain M79.602 729.5       Disposition:   Disposition: Discharged  Condition: Stable         Terrance Esquivel NP  11/07/19 9350

## 2019-11-07 NOTE — TELEPHONE ENCOUNTER
----- Message from Evie Keita sent at 11/7/2019  8:56 AM CST -----  Contact: Patient  Patient fell on yesterday, pain in upper left arm. Was seen in ER and would like know what he can do for the pain.    Phone: [phone]

## 2019-11-08 ENCOUNTER — HOSPITAL ENCOUNTER (OUTPATIENT)
Dept: RADIOLOGY | Facility: HOSPITAL | Age: 44
Discharge: HOME | End: 2019-11-08
Attending: PHYSICIAN ASSISTANT
Payer: COMMERCIAL

## 2019-11-08 ENCOUNTER — OFFICE VISIT (OUTPATIENT)
Dept: SPORTS MEDICINE | Facility: CLINIC | Age: 44
End: 2019-11-08
Payer: COMMERCIAL

## 2019-11-08 VITALS
DIASTOLIC BLOOD PRESSURE: 68 MMHG | SYSTOLIC BLOOD PRESSURE: 134 MMHG | WEIGHT: 275 LBS | BODY MASS INDEX: 37.25 KG/M2 | HEIGHT: 72 IN | HEART RATE: 83 BPM

## 2019-11-08 DIAGNOSIS — M75.102 NONTRAUMATIC TEAR OF LEFT ROTATOR CUFF, UNSPECIFIED TEAR EXTENT: ICD-10-CM

## 2019-11-08 DIAGNOSIS — Z09 SURGERY FOLLOW-UP EXAMINATION: ICD-10-CM

## 2019-11-08 DIAGNOSIS — S46.212A RUPTURE OF LEFT DISTAL BICEPS TENDON, INITIAL ENCOUNTER: Primary | ICD-10-CM

## 2019-11-08 DIAGNOSIS — M25.512 LEFT SHOULDER PAIN, UNSPECIFIED CHRONICITY: ICD-10-CM

## 2019-11-08 DIAGNOSIS — Z98.890 S/P ARTHROSCOPY OF SHOULDER: ICD-10-CM

## 2019-11-08 DIAGNOSIS — S46.212A RUPTURE OF LEFT DISTAL BICEPS TENDON, INITIAL ENCOUNTER: ICD-10-CM

## 2019-11-08 PROCEDURE — 99024 POSTOP FOLLOW-UP VISIT: CPT | Mod: S$GLB,,, | Performed by: PHYSICIAN ASSISTANT

## 2019-11-08 PROCEDURE — 99024 PR POST-OP FOLLOW-UP VISIT: ICD-10-PCS | Mod: S$GLB,,, | Performed by: PHYSICIAN ASSISTANT

## 2019-11-08 PROCEDURE — 99999 PR PBB SHADOW E&M-EST. PATIENT-LVL V: ICD-10-PCS | Mod: PBBFAC,,, | Performed by: PHYSICIAN ASSISTANT

## 2019-11-08 PROCEDURE — 73221 MRI JOINT UPR EXTREM W/O DYE: CPT | Mod: TC,LT

## 2019-11-08 PROCEDURE — 99999 PR PBB SHADOW E&M-EST. PATIENT-LVL V: CPT | Mod: PBBFAC,,, | Performed by: PHYSICIAN ASSISTANT

## 2019-11-08 NOTE — PROGRESS NOTES
"CC: left shoulder post op 2 weeks and new left elbow pain after fall    DATE OF SURGERY:  10/25/2019      PREOPERATIVE DIAGNOSES:   1. Left shoulder labral tear / biceps tendinopathy  2. Left shoulder impingement  3. Left shoulder retained painful hardware     POSTOPERATIVE DIAGNOSES:   1. Left shoulder labral tear / biceps tendinopathy  2. Left shoulder impingement  3. Left shoulder retained painful hardware     PROCEDURE:   1. Left shoulder open subpectoral biceps tenodesis  2. Left shoulder arthroscopic subacromial decompression.   3. Left shoulder arthroscopic debridement labrum  4. Hardware removal left shoulder, deep     SURGEON: Shaji Galloway M.D.      ASSISTANT: Dinah Ignacio.     Pt presents for 2 week post-op evaluation. Pt states that he slipped in the bathroom and landed on the left arm causing a "pop" sound 2 days ago. He went to the Emergency Department for evaluation of a mechanical fall directly on shoulder, x-rays were negative for fracture. He reports increased pain from fall. Prior to fall, he reports pain and swelling at minimum. Now pain is 10/10, points to distal biceps.  He is doing PT in [facility] and progressing as scheduled. Pt is taking pain meds as needed. Denies fever, chills, discharge from wound site, and N/V.      Review of Systems   Constitution: Negative. Negative for chills, fever and night sweats.    Cardiovascular: Negative for chest pain and syncope.   Respiratory: Negative for cough and shortness of breath.    Gastrointestinal: Negative for abdominal pain and bowel incontinence.      PE:    /68   Pulse 83   Ht 6' (1.829 m)   Wt 124.7 kg (275 lb)   BMI 37.30 kg/m²      left shoulder    Incision healed  No sign of infection  Swelling resolved  Compartments soft  Neurovascular status intact in extremity    Left elbow   Bruising distal bicep  +TTP over distal and proximal biceps  +Hook  +pain with supination   4/5 strength    PROM  Forward elevation N/A " degrees  External rotation N/A degrees    Assessment:  2 weeks s/p RCR    Plan:    1. MRI left elbow to evaluate for distal biceps tendon rupture.   2. Continue PT   3. Pain medication as needed for PT; try to wean off for next visit  4. RTC to see Dr. Shaji Galloway  in 1 week for MRI results and for follow-up.         All of the patient's questions were answered and the patient will contact us if they have any questions or concerns in the interim.

## 2019-11-12 ENCOUNTER — OFFICE VISIT (OUTPATIENT)
Dept: SPORTS MEDICINE | Facility: CLINIC | Age: 44
End: 2019-11-12
Payer: COMMERCIAL

## 2019-11-12 VITALS
HEIGHT: 72 IN | WEIGHT: 275 LBS | DIASTOLIC BLOOD PRESSURE: 68 MMHG | SYSTOLIC BLOOD PRESSURE: 125 MMHG | BODY MASS INDEX: 37.25 KG/M2 | HEART RATE: 79 BPM

## 2019-11-12 DIAGNOSIS — Z98.890 POST-OPERATIVE STATE: Primary | ICD-10-CM

## 2019-11-12 DIAGNOSIS — Z98.890 S/P SHOULDER SURGERY: ICD-10-CM

## 2019-11-12 PROCEDURE — 99999 PR PBB SHADOW E&M-EST. PATIENT-LVL III: ICD-10-PCS | Mod: PBBFAC,,, | Performed by: ORTHOPAEDIC SURGERY

## 2019-11-12 PROCEDURE — 99999 PR PBB SHADOW E&M-EST. PATIENT-LVL III: CPT | Mod: PBBFAC,,, | Performed by: ORTHOPAEDIC SURGERY

## 2019-11-12 PROCEDURE — 99024 POSTOP FOLLOW-UP VISIT: CPT | Mod: S$GLB,,, | Performed by: ORTHOPAEDIC SURGERY

## 2019-11-12 PROCEDURE — 99024 PR POST-OP FOLLOW-UP VISIT: ICD-10-PCS | Mod: S$GLB,,, | Performed by: ORTHOPAEDIC SURGERY

## 2019-11-12 NOTE — PROGRESS NOTES
"CC: left shoulder post op 2 weeks and new left elbow pain after fall    DATE OF SURGERY:  10/25/2019      PREOPERATIVE DIAGNOSES:   1. Left shoulder labral tear / biceps tendinopathy  2. Left shoulder impingement  3. Left shoulder retained painful hardware     POSTOPERATIVE DIAGNOSES:   1. Left shoulder labral tear / biceps tendinopathy  2. Left shoulder impingement  3. Left shoulder retained painful hardware     PROCEDURE:   1. Left shoulder open subpectoral biceps tenodesis  2. Left shoulder arthroscopic subacromial decompression.   3. Left shoulder arthroscopic debridement labrum  4. Hardware removal left shoulder, deep     SURGEON: Shaji Galloway M.D.      History: Pt states that he slipped in the bathroom and landed on the left arm causing a "pop". He went to the Emergency Department for evaluation of a mechanical fall directly on shoulder, x-rays were negative for fracture. He reports increased pain from fall. Prior to fall, he reports pain and swelling at minimum. He is doing PT in [facility] and progressing as scheduled. Pt is taking pain meds as needed. Denies fever, chills, discharge from wound site, and N/V.    Review of Systems   Constitution: Negative. Negative for chills, fever and night sweats.    Cardiovascular: Negative for chest pain and syncope.   Respiratory: Negative for cough and shortness of breath.    Gastrointestinal: Negative for abdominal pain and bowel incontinence.      PE:    /68   Pulse 79   Ht 6' (1.829 m)   Wt 124.7 kg (275 lb)   BMI 37.30 kg/m²      left shoulder    Incision healed  No sign of infection  Swelling resolved  Compartments soft  Neurovascular status intact in extremity    Left elbow   Bruising distal bicep  +TTP over distal and proximal biceps  +Hook  +pain with supination   4/5 strength    PROM  Forward elevation N/A degrees  External rotation N/A degrees    MRI Left elbow (11/8/19): rade 1 distal biceps muscle strain.  No evidence of biceps tendon " tear.    Assessment:  3 weeks s/p RCR  Left distal biceps muscle strain    Plan:  1. Continue physical therapy  2. Continue sling for 1 more week  3. Follow up 3 weeks for 6 weeks post op visit

## 2019-11-13 DIAGNOSIS — M75.100 ROTATOR CUFF TEAR: ICD-10-CM

## 2019-11-13 DIAGNOSIS — M75.22 BICEPS TENDINITIS OF LEFT UPPER EXTREMITY: ICD-10-CM

## 2019-11-13 DIAGNOSIS — M75.102 NONTRAUMATIC TEAR OF LEFT ROTATOR CUFF, UNSPECIFIED TEAR EXTENT: ICD-10-CM

## 2019-11-13 DIAGNOSIS — G89.29 CHRONIC LEFT SHOULDER PAIN: ICD-10-CM

## 2019-11-13 DIAGNOSIS — M25.512 CHRONIC LEFT SHOULDER PAIN: ICD-10-CM

## 2019-11-14 ENCOUNTER — TELEPHONE (OUTPATIENT)
Dept: SPORTS MEDICINE | Facility: CLINIC | Age: 44
End: 2019-11-14

## 2019-11-14 RX ORDER — ACETAMINOPHEN AND CODEINE PHOSPHATE 300; 60 MG/1; MG/1
1 TABLET ORAL EVERY 6 HOURS PRN
Qty: 28 TABLET | Refills: 0 | Status: SHIPPED | OUTPATIENT
Start: 2019-11-14 | End: 2019-11-20 | Stop reason: SDUPTHER

## 2019-11-14 RX ORDER — PROMETHAZINE HYDROCHLORIDE 25 MG/1
25 TABLET ORAL EVERY 4 HOURS PRN
Qty: 30 TABLET | Refills: 0 | Status: SHIPPED | OUTPATIENT
Start: 2019-11-14

## 2019-11-14 NOTE — TELEPHONE ENCOUNTER
Medical Condtion/Body Part Injured: L Shoulder  Estimated Date of Onset of Condition or Injury: 6/28/18  Last Day of Work: 10/24/19    Employer Name: [employer]  Employee Job Title:   Job Functions/Tasks: Office Work; Paperwork; LE Patrols; general overview of staff    When do you plan to return to work: ASAP  Are you trying to return to work: NoRestrictions/Restrictions: Restriction as listed: Desk Duty    How do you want the forms returned: Fax [phone]    Colleen Amedee MA [facility]

## 2019-11-15 ENCOUNTER — TELEPHONE (OUTPATIENT)
Dept: SPORTS MEDICINE | Facility: CLINIC | Age: 44
End: 2019-11-15

## 2019-11-15 NOTE — TELEPHONE ENCOUNTER
Received FMLA forms. Forms completed and faxed to [phone].    [facility]

## 2019-11-20 DIAGNOSIS — M75.100 ROTATOR CUFF TEAR: ICD-10-CM

## 2019-11-20 DIAGNOSIS — G89.29 CHRONIC LEFT SHOULDER PAIN: ICD-10-CM

## 2019-11-20 DIAGNOSIS — M25.512 CHRONIC LEFT SHOULDER PAIN: ICD-10-CM

## 2019-11-20 RX ORDER — ACETAMINOPHEN AND CODEINE PHOSPHATE 300; 60 MG/1; MG/1
1 TABLET ORAL EVERY 6 HOURS PRN
Qty: 28 TABLET | Refills: 0 | Status: SHIPPED | OUTPATIENT
Start: 2019-11-20 | End: 2019-11-26 | Stop reason: SDUPTHER

## 2019-11-26 DIAGNOSIS — M75.100 ROTATOR CUFF TEAR: ICD-10-CM

## 2019-11-26 DIAGNOSIS — M25.512 CHRONIC LEFT SHOULDER PAIN: ICD-10-CM

## 2019-11-26 DIAGNOSIS — G89.29 CHRONIC LEFT SHOULDER PAIN: ICD-10-CM

## 2019-11-26 RX ORDER — ACETAMINOPHEN AND CODEINE PHOSPHATE 300; 60 MG/1; MG/1
1 TABLET ORAL EVERY 6 HOURS PRN
Qty: 28 TABLET | Refills: 0 | Status: SHIPPED | OUTPATIENT
Start: 2019-11-26 | End: 2019-12-05 | Stop reason: SDUPTHER

## 2019-12-05 ENCOUNTER — OFFICE VISIT (OUTPATIENT)
Dept: SPORTS MEDICINE | Facility: CLINIC | Age: 44
End: 2019-12-05
Payer: COMMERCIAL

## 2019-12-05 VITALS
HEART RATE: 71 BPM | SYSTOLIC BLOOD PRESSURE: 110 MMHG | WEIGHT: 275 LBS | DIASTOLIC BLOOD PRESSURE: 67 MMHG | HEIGHT: 72 IN | BODY MASS INDEX: 37.25 KG/M2

## 2019-12-05 DIAGNOSIS — Z98.890 POST-OPERATIVE STATE: ICD-10-CM

## 2019-12-05 DIAGNOSIS — M25.512 CHRONIC LEFT SHOULDER PAIN: ICD-10-CM

## 2019-12-05 DIAGNOSIS — G89.29 CHRONIC LEFT SHOULDER PAIN: ICD-10-CM

## 2019-12-05 DIAGNOSIS — M75.100 ROTATOR CUFF TEAR: ICD-10-CM

## 2019-12-05 DIAGNOSIS — Z98.890 S/P SHOULDER SURGERY: Primary | ICD-10-CM

## 2019-12-05 PROCEDURE — 99999 PR PBB SHADOW E&M-EST. PATIENT-LVL III: ICD-10-PCS | Mod: PBBFAC,,, | Performed by: ORTHOPAEDIC SURGERY

## 2019-12-05 PROCEDURE — 99024 POSTOP FOLLOW-UP VISIT: CPT | Mod: S$GLB,,, | Performed by: ORTHOPAEDIC SURGERY

## 2019-12-05 PROCEDURE — 99999 PR PBB SHADOW E&M-EST. PATIENT-LVL III: CPT | Mod: PBBFAC,,, | Performed by: ORTHOPAEDIC SURGERY

## 2019-12-05 PROCEDURE — 99024 PR POST-OP FOLLOW-UP VISIT: ICD-10-PCS | Mod: S$GLB,,, | Performed by: ORTHOPAEDIC SURGERY

## 2019-12-05 RX ORDER — ACETAMINOPHEN AND CODEINE PHOSPHATE 300; 60 MG/1; MG/1
1 TABLET ORAL EVERY 6 HOURS PRN
Qty: 28 TABLET | Refills: 0 | Status: SHIPPED | OUTPATIENT
Start: 2019-12-05 | End: 2019-12-11 | Stop reason: SDUPTHER

## 2019-12-05 NOTE — LETTER
[place] [address]  [facility] [address]  Phone: [phone] December 5, 2019     Patient: Juan Bennett   YOB: 1975   Date of Visit: 12/5/2019       To Whom It May Concern:    Juan Bennett is a patient of mine.  He is cleared to return to work on Monday, 12/9/19 with light duty.  No lift, push, or pull over 3lbs.  Work restrictions will be updated in 6 weeks.    If you have any questions or concerns, please don't hesitate to contact my office.    Sincerely,    Shaji Galloway MD

## 2019-12-05 NOTE — PROGRESS NOTES
CC: Left shoulder post op 6 weeks    Juan Bennett returns for follow up evaluation of left shoulder.  He continues physical therapy.    DATE OF SURGERY:  10/25/2019      PROCEDURE:   1. Left shoulder open subpectoral biceps tenodesis  2. Left shoulder arthroscopic subacromial decompression.   3. Left shoulder arthroscopic debridement labrum  4. Hardware removal left shoulder, deep     SURGEON: Shaji Galloway M.D.     Review of Systems   Constitution: Negative. Negative for chills, fever and night sweats.    Cardiovascular: Negative for chest pain and syncope.   Respiratory: Negative for cough and shortness of breath.   Gastrointestinal: Negative for abdominal pain and bowel incontinence.     PE:  Vitals:    12/05/19 1046   BP: 110/67   Pulse: 71   Weight: 124.7 kg (275 lb)   Height: 6' (1.829 m)   PainSc:   4     Left shoulder  Incision healed  No sign of infection  Swelling resolved  Compartments soft  Neurovascular status intact in extremity    PROM  Forward elevation 90 degrees  External rotation 30 degrees    Assessment:  Appropriate rotator cuff surgery recovery at 6 weeks    Plan:  1. DC sling  2. Refill meds  3. Work status: He is cleared to return to work on Monday, 12/9/19 with light duty.  No lift, push, or pull over 3lbs.  Work restrictions will be updated in 6 weeks.  4. Follow up 6 weeks

## 2019-12-11 DIAGNOSIS — M75.100 ROTATOR CUFF TEAR: ICD-10-CM

## 2019-12-11 DIAGNOSIS — M25.512 CHRONIC LEFT SHOULDER PAIN: ICD-10-CM

## 2019-12-11 DIAGNOSIS — G89.29 CHRONIC LEFT SHOULDER PAIN: ICD-10-CM

## 2019-12-12 RX ORDER — ACETAMINOPHEN AND CODEINE PHOSPHATE 300; 60 MG/1; MG/1
1 TABLET ORAL EVERY 6 HOURS PRN
Qty: 28 TABLET | Refills: 0 | Status: SHIPPED | OUTPATIENT
Start: 2019-12-12 | End: 2019-12-18 | Stop reason: SDUPTHER

## 2019-12-18 DIAGNOSIS — M75.100 ROTATOR CUFF TEAR: ICD-10-CM

## 2019-12-18 DIAGNOSIS — M25.512 CHRONIC LEFT SHOULDER PAIN: ICD-10-CM

## 2019-12-18 DIAGNOSIS — G89.29 CHRONIC LEFT SHOULDER PAIN: ICD-10-CM

## 2019-12-19 ENCOUNTER — OFFICE VISIT (OUTPATIENT)
Dept: PAIN MEDICINE | Facility: CLINIC | Age: 44
End: 2019-12-19
Payer: COMMERCIAL

## 2019-12-19 VITALS
RESPIRATION RATE: 18 BRPM | WEIGHT: 275 LBS | DIASTOLIC BLOOD PRESSURE: 64 MMHG | SYSTOLIC BLOOD PRESSURE: 106 MMHG | HEART RATE: 68 BPM | HEIGHT: 72 IN | BODY MASS INDEX: 37.25 KG/M2

## 2019-12-19 DIAGNOSIS — M25.512 CHRONIC LEFT SHOULDER PAIN: Primary | ICD-10-CM

## 2019-12-19 DIAGNOSIS — G89.29 CHRONIC LEFT SHOULDER PAIN: Primary | ICD-10-CM

## 2019-12-19 PROCEDURE — 99999 PR PBB SHADOW E&M-EST. PATIENT-LVL III: CPT | Mod: PBBFAC,,, | Performed by: PAIN MEDICINE

## 2019-12-19 PROCEDURE — 99214 PR OFFICE/OUTPT VISIT, EST, LEVL IV, 30-39 MIN: ICD-10-PCS | Mod: S$GLB,,, | Performed by: PAIN MEDICINE

## 2019-12-19 PROCEDURE — 99214 OFFICE O/P EST MOD 30 MIN: CPT | Mod: S$GLB,,, | Performed by: PAIN MEDICINE

## 2019-12-19 PROCEDURE — 99999 PR PBB SHADOW E&M-EST. PATIENT-LVL III: ICD-10-PCS | Mod: PBBFAC,,, | Performed by: PAIN MEDICINE

## 2019-12-19 RX ORDER — ACETAMINOPHEN AND CODEINE PHOSPHATE 300; 60 MG/1; MG/1
1 TABLET ORAL EVERY 6 HOURS PRN
Qty: 28 TABLET | Refills: 0 | Status: SHIPPED | OUTPATIENT
Start: 2019-12-19 | End: 2019-12-29

## 2019-12-19 NOTE — PATIENT INSTRUCTIONS
- continue to apply topical cream to the left shoulder  - continue gabapentin 400mg twice daily  - continue physical therapy exercises as tolerated  - will attempt to find more TENs pads to apply to the shoulder  - follow up in clinic in 6 weeks

## 2019-12-19 NOTE — PROGRESS NOTES
Chief Pain Complaint:  Left shoulder pain    History of Present Illness:   Mr. Bennett returns to clinic for follow-up.  He underwent left shoulder surgery on October 25th and then subsequently had a fall while in the shower in early November and underwent an MRI of the left biceps which shows a strain of the biceps tendon.  He continues to have some left shoulder pain however it is greatly improved from prior to surgery.  Today rates his pain as 6/10 and has greatly improved range of motion of the left shoulder for which he is very pleased at this time he is unsure if the left shoulder and arm pain is more so related to the surgery or from the fall.  He has decreased his gabapentin from 300 mg 4 times daily to twice daily dosing and continues to use topical compounding cream on his left shoulder.  He has been participating in physical therapy however due to travel and cost this is somewhat prohibitive so he continues to perform exercises at home on a daily basis. He has a TENS unit that he has used in the past however he is currently out of the pads to attach the skin therefore will try to help him find some today.    Initial HPI:  This patient is a 44 y.o. male who presents today complaining of the above noted pain/s. The patient describes the pain as follows.  Mr. Bennett is new patient clinic with complaints of left shoulder pain. He reports approximately 1 year ago he had a completely torn left rotator cuff in addition to a fracture of his left scapula which required rotator cuff surgery.  He also suffered 3 strokes last year from which she has fully recovered.  He has been in physical therapy for several months and completed greater than 6 weeks physical therapy but at the end of March 2019.  He continues to have numbness tingling and weakness in the left upper extremity and excruciating pain when he palpates the area around his left shoulder.  He denies having had any cervical spine issues in the past.  He has  tried numerous medications including Mobic, ibuprofen, icy Hot which have not helped in addition to heat and ice.  LE thinks he found to be helpful thus far has been diclofenac and Tylenol No.  3 and 4.  He had injection in his left shoulder a few weeks ago which he reports helped for approximately 2-3 days and then the effects of.  Today rates his pain as 6/10 and describes an aching sensation which is constant and then sharp from time to time which radiates into the left shoulder and down into the left biceps.  He does endorse having numbness and tingling distally into his hand which was not present prior to the surgery.  He denies symptoms in the right upper extremity.    Previous Therapy:  Medications:  Mobic, diclofenac, ibuprofen, Tylenol No.  3, Tylenol No.  4, icy Hot, gabapentin, baclofen  Injections:  Left shoulder steroid injection  Surgeries: Left Rotator Cuff Repair  Physical Therapy: Recently Completed and Completed in the Past    Past Surgical History:   Procedure Laterality Date    APPENDECTOMY      ARTHROSCOPIC DEBRIDEMENT OF SHOULDER  10/25/2019    Procedure: DEBRIDEMENT, SHOULDER, ARTHROSCOPIC;  Surgeon: Shaji Galloway MD;  Location: [facility] OR;  Service: Orthopedics;;  labrum    ARTHROSCOPIC REPAIR OF ROTATOR CUFF OF SHOULDER Left 11/12/2018    Procedure: REPAIR, ROTATOR CUFF, ARTHROSCOPIC;  Surgeon: Shaji Galloway MD;  Location: [facility] OR;  Service: Orthopedics;  Laterality: Left;  regional w/o catheter     ARTHROSCOPY OF SHOULDER WITH DECOMPRESSION OF SUBACROMIAL SPACE Left 11/12/2018    Procedure: ARTHROSCOPY, SHOULDER, WITH SUBACROMIAL SPACE DECOMPRESSION;  Surgeon: Shaji Galloway MD;  Location: [facility] OR;  Service: Orthopedics;  Laterality: Left;    ARTHROSCOPY OF SHOULDER WITH DECOMPRESSION OF SUBACROMIAL SPACE Left 10/25/2019    Procedure: ARTHROSCOPY, SHOULDER, WITH SUBACROMIAL SPACE DECOMPRESSION;  Surgeon: Shaji Galloway MD;  Location: [facility] OR;  Service:  Orthopedics;  Laterality: Left;    BICEPS TENDON REPAIR Left 10/25/2019    Procedure: REPAIR, TENDON, BICEPS;  Surgeon: Shaji Galloway MD;  Location: [facility] OR;  Service: Orthopedics;  Laterality: Left;    CHOLECYSTECTOMY      DISTAL CLAVICLE EXCISION Left 11/12/2018    Procedure: EXCISION, CLAVICLE, DISTAL;  Surgeon: Shaji Galloway MD;  Location: [facility] OR;  Service: Orthopedics;  Laterality: Left;    ELBOW SURGERY Right     HAND SURGERY Right     HARDWARE REMOVAL Left 10/25/2019    Procedure: REMOVAL, HARDWARE;  Surgeon: Shaji Galloway MD;  Location: [facility] OR;  Service: Orthopedics;  Laterality: Left;    MOUTH SURGERY      SHOULDER SURGERY Left 10/25/2019    WRIST SURGERY       Imaging / Labs / Studies (reviewed on 12/19/2019):  Review of Systems:  Review of Systems   Constitutional: Negative for fever.   Eyes: Negative for blurred vision.   Respiratory: Negative for cough and wheezing.    Cardiovascular: Negative for chest pain and orthopnea.   Gastrointestinal: Negative for constipation, diarrhea, nausea and vomiting.   Genitourinary: Negative for dysuria.   Musculoskeletal: Positive for joint pain. Negative for back pain and neck pain.   Skin: Negative for itching and rash.   Neurological: Positive for weakness.   Endo/Heme/Allergies: Does not bruise/bleed easily.       Physical Exam:  There were no vitals taken for this visit. (reviewed on 12/19/2019)\  General    Constitutional: He is oriented to person, place, and time. He appears well-developed and well-nourished.   HENT:   Head: Normocephalic and atraumatic.   Eyes: EOM are normal.   Neck: Neck supple.   Cardiovascular: Normal rate.    Pulmonary/Chest: Effort normal.   Abdominal: He exhibits no distension.   Neurological: He is alert and oriented to person, place, and time. No cranial nerve deficit.   Psychiatric: He has a normal mood and affect.             Left Shoulder Exam     Inspection/Observation   Scars: present    Comments:   Sensation intact to light touch bilateral upper extremities; 5/5 strength bilateral wrist extensors and flexors; increased range of motion of left shoulder      Reflexes     Left Side  Biceps:  2+  Triceps:  2+  Brachioradialis:  2+      Assessment  Postop surgical pain  Left shoulder pain    1. 44 y.o. year old patient with PMH of   Past Medical History:   Diagnosis Date    Hypercholesteremia     Hypertension     Stroke 06/2018      presenting with pain located left shoulder  2. Pain Generators / Etiology:  Left shoulder postop surgical pain; rule out cervical spine pain  3. Failed Meds (E- Effective, NE- Not Effective):  Tylenol No.  3-effective, Tylenol No.  4-effective, diclofenac-effective, Mobic-on effective, ibuprofen-not effective, icy Hot-not effective  4. Physical Therapy - Recently Completed and Completed in the Past  5. Psychological comorbidities - None  6. Anticoagulants / Antiplatelets: Aspirin 81mg     PLAN:  1. Medications:  Continue gabapentin 300 mg twice daily, continue topical compounding cream, continue Celebrex as prescribed  2. PT - continue physical therapy exercises as tolerated; encourage patient to attend formal physical therapy however it is cost prohibitive for him  3. Psychological - none  4. Labs - obtain  none  5. Imaging - none;  6. Interventions - schedule none  7. Referrals - none; continue to follow up with orthopedics  8. Records - none  9. Follow up visit - follow up in clinic in 6 weeks  10. Patient Questions - answered all of the patient's questions regarding diagnosis, therapy, and treatment  11.  This condition does not require this patient to take time off of work    ESE Han MD  Interventional Pain  [facility] [place]

## 2019-12-31 ENCOUNTER — TELEPHONE (OUTPATIENT)
Dept: SPORTS MEDICINE | Facility: CLINIC | Age: 44
End: 2019-12-31

## 2019-12-31 NOTE — TELEPHONE ENCOUNTER
Attempted to reach patient on all available phone numbers to discuss medication refill. Voicemail not set up on any of the phone numbers. Will try again at a later time.

## 2020-01-03 ENCOUNTER — PATIENT MESSAGE (OUTPATIENT)
Dept: SPORTS MEDICINE | Facility: CLINIC | Age: 45
End: 2020-01-03

## 2020-01-03 NOTE — TELEPHONE ENCOUNTER
Spoke with patient over the phone about his prescription refill request for the Tylenol #4.  I explained to him that I would discuss this with Dr. Galloway on Monday and we would get back with him with our recommendation. In the meantime, I instructed patient to continue with the plan from Dr. Han with pain management. He was agreeable to this and voiced understanding.

## 2020-01-06 ENCOUNTER — PATIENT MESSAGE (OUTPATIENT)
Dept: SPORTS MEDICINE | Facility: CLINIC | Age: 45
End: 2020-01-06

## 2020-01-06 DIAGNOSIS — Z98.890 S/P ROTATOR CUFF REPAIR: Primary | ICD-10-CM

## 2020-01-06 RX ORDER — ACETAMINOPHEN AND CODEINE PHOSPHATE 300; 60 MG/1; MG/1
1 TABLET ORAL EVERY 8 HOURS PRN
Qty: 21 TABLET | Refills: 0 | Status: SHIPPED | OUTPATIENT
Start: 2020-01-06 | End: 2020-01-13 | Stop reason: SDUPTHER

## 2020-01-06 NOTE — TELEPHONE ENCOUNTER
Patient is 10 weeks s/p RCR.  Patient is unable to take Norco or Percocet to due allergic reactions.  Discussed with Dr. Galloway the refill request of Tylenol #4.  Dr. Galloway is okay with refill for the next 2 weeks until the 3 month anita (post op period).  At that time, patient will be transferred back to Dr. Han in pain management for further care outside the post op period window.     Unable to reach patient by any phone numbers listed in his chart.  Will send message through [facility] portal.

## 2020-01-06 NOTE — TELEPHONE ENCOUNTER
----- Message from Kenna Leiva MA sent at 1/3/2020  3:16 PM CST -----  Regarding: Meds   Reminder to talk to Dinah/Dr. Galloway about medication for Sabrina

## 2020-01-13 DIAGNOSIS — Z98.890 S/P ROTATOR CUFF REPAIR: ICD-10-CM

## 2020-01-13 RX ORDER — ACETAMINOPHEN AND CODEINE PHOSPHATE 300; 60 MG/1; MG/1
1 TABLET ORAL EVERY 12 HOURS
Qty: 14 TABLET | Refills: 0 | Status: SHIPPED | OUTPATIENT
Start: 2020-01-13 | End: 2020-01-23 | Stop reason: SDUPTHER

## 2020-01-16 ENCOUNTER — OFFICE VISIT (OUTPATIENT)
Dept: SPORTS MEDICINE | Facility: CLINIC | Age: 45
End: 2020-01-16
Payer: COMMERCIAL

## 2020-01-16 VITALS
BODY MASS INDEX: 37.25 KG/M2 | SYSTOLIC BLOOD PRESSURE: 116 MMHG | DIASTOLIC BLOOD PRESSURE: 71 MMHG | HEIGHT: 72 IN | WEIGHT: 275 LBS | HEART RATE: 80 BPM

## 2020-01-16 DIAGNOSIS — Z98.890 S/P SHOULDER SURGERY: ICD-10-CM

## 2020-01-16 DIAGNOSIS — Z98.890 POST-OPERATIVE STATE: Primary | ICD-10-CM

## 2020-01-16 PROCEDURE — 99999 PR PBB SHADOW E&M-EST. PATIENT-LVL III: CPT | Mod: PBBFAC,,, | Performed by: ORTHOPAEDIC SURGERY

## 2020-01-16 PROCEDURE — 99024 POSTOP FOLLOW-UP VISIT: CPT | Mod: S$GLB,,, | Performed by: ORTHOPAEDIC SURGERY

## 2020-01-16 PROCEDURE — 99024 PR POST-OP FOLLOW-UP VISIT: ICD-10-PCS | Mod: S$GLB,,, | Performed by: ORTHOPAEDIC SURGERY

## 2020-01-16 PROCEDURE — 99999 PR PBB SHADOW E&M-EST. PATIENT-LVL III: ICD-10-PCS | Mod: PBBFAC,,, | Performed by: ORTHOPAEDIC SURGERY

## 2020-01-16 NOTE — PROGRESS NOTES
CC: Left shoulder post op 3 months    Juan Bennett returns for follow up evaluation of left shoulder.  He has finished physical therapy.    DATE OF SURGERY:  10/25/2019      PROCEDURE:   1. Left shoulder open subpectoral biceps tenodesis  2. Left shoulder arthroscopic subacromial decompression.   3. Left shoulder arthroscopic debridement labrum  4. Hardware removal left shoulder, deep     SURGEON: Shaji Galloway M.D.     Review of Systems   Constitution: Negative. Negative for chills, fever and night sweats.    Cardiovascular: Negative for chest pain and syncope.   Respiratory: Negative for cough and shortness of breath.   Gastrointestinal: Negative for abdominal pain and bowel incontinence.     PE:  Vitals:    01/16/20 1044   PainSc:   5     Left shoulder  Incision healed  No sign of infection  Swelling resolved  Compartments soft  Neurovascular status intact in extremity    AROM  Forward elevation 165 degrees  External rotation 75 degrees  Internal rotation T10    Assessment:  Appropriate rotator cuff surgery recovery at 3 months    Plan:  1. Continue Celebrex  2. Work status: He is cleared to return to work on Monday, 12/9/19 with light duty.  No lift, push, or pull over 10lbs.  Work restrictions will be updated in 3 months.  3. Follow up 3 months

## 2020-01-16 NOTE — LETTER
[facility]  [address]  [facility] [address]  Phone: [phone] January 16, 2020     Patient: Juan Bennett   YOB: 1975   Date of Visit: 1/16/2020       To Whom It May Concern:    Juan Bennett is a patient of mine.  He is cleared to return to work on Monday, 12/9/19 with light duty.  No lift, push, or pull over 10lbs.  Work restrictions will be updated in 3 months.    If you have any questions or concerns, please don't hesitate to contact my office.    Sincerely,    Shaji Galloway MD

## 2020-01-23 DIAGNOSIS — Z98.890 S/P ROTATOR CUFF REPAIR: ICD-10-CM

## 2020-01-23 RX ORDER — ACETAMINOPHEN AND CODEINE PHOSPHATE 300; 60 MG/1; MG/1
1 TABLET ORAL DAILY
Qty: 7 TABLET | Refills: 0 | Status: SHIPPED | OUTPATIENT
Start: 2020-01-23 | End: 2020-02-02

## 2020-01-30 ENCOUNTER — OFFICE VISIT (OUTPATIENT)
Dept: PAIN MEDICINE | Facility: CLINIC | Age: 45
End: 2020-01-30
Payer: COMMERCIAL

## 2020-01-30 VITALS
WEIGHT: 271.63 LBS | SYSTOLIC BLOOD PRESSURE: 105 MMHG | DIASTOLIC BLOOD PRESSURE: 64 MMHG | HEIGHT: 72 IN | BODY MASS INDEX: 36.79 KG/M2 | HEART RATE: 83 BPM

## 2020-01-30 DIAGNOSIS — G89.29 CHRONIC LEFT SHOULDER PAIN: Primary | ICD-10-CM

## 2020-01-30 DIAGNOSIS — M25.512 CHRONIC LEFT SHOULDER PAIN: Primary | ICD-10-CM

## 2020-01-30 PROCEDURE — 99999 PR PBB SHADOW E&M-EST. PATIENT-LVL III: CPT | Mod: PBBFAC,,, | Performed by: PAIN MEDICINE

## 2020-01-30 PROCEDURE — 99214 PR OFFICE/OUTPT VISIT, EST, LEVL IV, 30-39 MIN: ICD-10-PCS | Mod: S$GLB,,, | Performed by: PAIN MEDICINE

## 2020-01-30 PROCEDURE — 99214 OFFICE O/P EST MOD 30 MIN: CPT | Mod: S$GLB,,, | Performed by: PAIN MEDICINE

## 2020-01-30 PROCEDURE — 99999 PR PBB SHADOW E&M-EST. PATIENT-LVL III: ICD-10-PCS | Mod: PBBFAC,,, | Performed by: PAIN MEDICINE

## 2020-01-30 RX ORDER — FAMOTIDINE 20 MG/1
TABLET, FILM COATED ORAL
Refills: 3 | COMMUNITY
Start: 2019-11-01

## 2020-01-30 RX ORDER — ESCITALOPRAM OXALATE 20 MG/1
TABLET ORAL
COMMUNITY
Start: 2020-01-03

## 2020-01-30 RX ORDER — BACLOFEN 10 MG/1
TABLET ORAL
COMMUNITY
Start: 2019-12-23 | End: 2020-02-07 | Stop reason: SDUPTHER

## 2020-01-30 NOTE — PATIENT INSTRUCTIONS
-continue gabapentin Celebrex and baclofen as prescribed  -please let our clinic know if refills are needed on these medications  -continue topical cream to left shoulder  -will fill out paperwork requested by player and will fax to employer and provide original copy 3 male to the patient  -follow up in clinic p.r.n.

## 2020-01-30 NOTE — PROGRESS NOTES
Chief Pain Complaint:  Left shoulder pain    History of Present Illness:   Mr. Bennett returns to clinic for follow-up.  He has undergone shoulder surgery and is continuing to recover from this which he states that he feels much better however today is that of a bad days his pain is increased to 5/10.  He has been physical therapy in the past which has been helpful in addition to using diclofenac gel on the shoulder.  He has been taking gabapentin and baclofen which she does find to be helpful.  He has brought paperwork with him today from his employer to see if he will be cleared to return to full duties based on the medications that he is currently taking.  We will fill his paper work out for him and faxed back to his employer.  He is very pleased with where he is this time in his recovery.    Interval HPI:  Mr. Bennett returns to clinic for follow-up.  He underwent left shoulder surgery on October 25th and then subsequently had a fall while in the shower in early November and underwent an MRI of the left biceps which shows a strain of the biceps tendon.  He continues to have some left shoulder pain however it is greatly improved from prior to surgery.  Today rates his pain as 6/10 and has greatly improved range of motion of the left shoulder for which he is very pleased at this time he is unsure if the left shoulder and arm pain is more so related to the surgery or from the fall.  He has decreased his gabapentin from 300 mg 4 times daily to twice daily dosing and continues to use topical compounding cream on his left shoulder.  He has been participating in physical therapy however due to travel and cost this is somewhat prohibitive so he continues to perform exercises at home on a daily basis. He has a TENS unit that he has used in the past however he is currently out of the pads to attach the skin therefore will try to help him find some today.    Initial HPI:  This patient is a 44 y.o. male who presents today  complaining of the above noted pain/s. The patient describes the pain as follows.  Mr. Bennett is new patient clinic with complaints of left shoulder pain. He reports approximately 1 year ago he had a completely torn left rotator cuff in addition to a fracture of his left scapula which required rotator cuff surgery.  He also suffered 3 strokes last year from which she has fully recovered.  He has been in physical therapy for several months and completed greater than 6 weeks physical therapy but at the end of March 2019.  He continues to have numbness tingling and weakness in the left upper extremity and excruciating pain when he palpates the area around his left shoulder.  He denies having had any cervical spine issues in the past.  He has tried numerous medications including Mobic, ibuprofen, icy Hot which have not helped in addition to heat and ice.  LE thinks he found to be helpful thus far has been diclofenac and Tylenol No.  3 and 4.  He had injection in his left shoulder a few weeks ago which he reports helped for approximately 2-3 days and then the effects of.  Today rates his pain as 6/10 and describes an aching sensation which is constant and then sharp from time to time which radiates into the left shoulder and down into the left biceps.  He does endorse having numbness and tingling distally into his hand which was not present prior to the surgery.  He denies symptoms in the right upper extremity.    Previous Therapy:  Medications:  Mobic, diclofenac, ibuprofen, Tylenol No.  3, Tylenol No.  4, icy Hot, gabapentin, baclofen, Voltaren gel  Injections:  Left shoulder steroid injection  Surgeries: Left Rotator Cuff Repair  Physical Therapy: Recently Completed and Completed in the Past    Past Surgical History:   Procedure Laterality Date    APPENDECTOMY      ARTHROSCOPIC DEBRIDEMENT OF SHOULDER  10/25/2019    Procedure: DEBRIDEMENT, SHOULDER, ARTHROSCOPIC;  Surgeon: Shaji Galloway MD;  Location: [facility]  OR;  Service: Orthopedics;;  labrum    ARTHROSCOPIC REPAIR OF ROTATOR CUFF OF SHOULDER Left 11/12/2018    Procedure: REPAIR, ROTATOR CUFF, ARTHROSCOPIC;  Surgeon: Shaji Galloway MD;  Location: [facility] OR;  Service: Orthopedics;  Laterality: Left;  regional w/o catheter     ARTHROSCOPY OF SHOULDER WITH DECOMPRESSION OF SUBACROMIAL SPACE Left 11/12/2018    Procedure: ARTHROSCOPY, SHOULDER, WITH SUBACROMIAL SPACE DECOMPRESSION;  Surgeon: Shaji Galloway MD;  Location: [facility] OR;  Service: Orthopedics;  Laterality: Left;    ARTHROSCOPY OF SHOULDER WITH DECOMPRESSION OF SUBACROMIAL SPACE Left 10/25/2019    Procedure: ARTHROSCOPY, SHOULDER, WITH SUBACROMIAL SPACE DECOMPRESSION;  Surgeon: Shaji Galloway MD;  Location: [facility] OR;  Service: Orthopedics;  Laterality: Left;    BICEPS TENDON REPAIR Left 10/25/2019    Procedure: REPAIR, TENDON, BICEPS;  Surgeon: Shaji Galloway MD;  Location: [facility] OR;  Service: Orthopedics;  Laterality: Left;    CHOLECYSTECTOMY      DISTAL CLAVICLE EXCISION Left 11/12/2018    Procedure: EXCISION, CLAVICLE, DISTAL;  Surgeon: Shaji Galloway MD;  Location: [facility] OR;  Service: Orthopedics;  Laterality: Left;    ELBOW SURGERY Right     HAND SURGERY Right     HARDWARE REMOVAL Left 10/25/2019    Procedure: REMOVAL, HARDWARE;  Surgeon: Shaji Galloway MD;  Location: [facility] OR;  Service: Orthopedics;  Laterality: Left;    MOUTH SURGERY      SHOULDER SURGERY Left 10/25/2019    WRIST SURGERY       Imaging / Labs / Studies (reviewed on 1/30/2020):  Review of Systems:  Review of Systems   Constitutional: Negative for fever.   Eyes: Negative for blurred vision.   Respiratory: Negative for cough and wheezing.    Cardiovascular: Negative for chest pain and orthopnea.   Gastrointestinal: Negative for constipation, diarrhea, nausea and vomiting.   Genitourinary: Negative for dysuria.   Musculoskeletal: Positive for joint pain. Negative for back pain and neck pain.   Skin:  Negative for itching and rash.   Neurological: Positive for weakness.   Endo/Heme/Allergies: Does not bruise/bleed easily.       Physical Exam:  /64   Pulse 83   Ht 6' (1.829 m)   Wt 123.2 kg (271 lb 9.7 oz)   BMI 36.84 kg/m²  (reviewed on 1/30/2020)\  General    Constitutional: He is oriented to person, place, and time. He appears well-developed and well-nourished.   HENT:   Head: Normocephalic and atraumatic.   Eyes: EOM are normal.   Neck: Neck supple.   Cardiovascular: Normal rate.    Pulmonary/Chest: Effort normal.   Abdominal: He exhibits no distension.   Neurological: He is alert and oriented to person, place, and time. No cranial nerve deficit.   Psychiatric: He has a normal mood and affect.               Assessment  Postop surgical pain  Left shoulder pain    1. 44 y.o. year old patient with PMH of   Past Medical History:   Diagnosis Date    Hypercholesteremia     Hypertension     Stroke 06/2018      presenting with pain located left shoulder  2. Pain Generators / Etiology:  Left shoulder postop surgical pain; rule out cervical spine pain  3. Failed Meds (E- Effective, NE- Not Effective):  Tylenol No.  3-effective, Tylenol No.  4-effective, diclofenac-effective, Mobic-on effective, ibuprofen-not effective, icy Hot-not effective  4. Physical Therapy - Recently Completed and Completed in the Past  5. Psychological comorbidities - None  6. Anticoagulants / Antiplatelets: Aspirin 81mg     PLAN:  1. Medications:  Continue gabapentin Celebrex and baclofen as prescribed; patient will notify us if any these medications running low we can refill them for him; continue using topical cream to left shoulder as this has been very helpful  2. PT - continue physical therapy exercises as tolerated  3. Psychological - none  4. Labs - obtain  none  5. Imaging - none;  6. Interventions - schedule none  7. Referrals - none; continue to follow up with orthopedics  8. Records - will fill out paperwork requested by  employ your and fax to his employer and male original copies back to him for his records  9. Follow up visit - follow up in clinic p.r.n.  10. Patient Questions - answered all of the patient's questions regarding diagnosis, therapy, and treatment  11.  This condition does not require this patient to take time off of work    ESE Han MD  Interventional Pain  [facility] [place]

## 2020-02-04 RX ORDER — GABAPENTIN 600 MG/1
TABLET ORAL
Qty: 120 TABLET | Refills: 3 | Status: SHIPPED | OUTPATIENT
Start: 2020-02-04 | End: 2020-02-07 | Stop reason: SDUPTHER

## 2020-02-07 ENCOUNTER — TELEPHONE (OUTPATIENT)
Dept: PAIN MEDICINE | Facility: CLINIC | Age: 45
End: 2020-02-07

## 2020-02-07 RX ORDER — BACLOFEN 10 MG/1
10 TABLET ORAL 3 TIMES DAILY PRN
Qty: 60 TABLET | Refills: 3 | Status: SHIPPED | OUTPATIENT
Start: 2020-02-07 | End: 2020-02-07 | Stop reason: SDUPTHER

## 2020-02-07 RX ORDER — BACLOFEN 10 MG/1
10 TABLET ORAL 3 TIMES DAILY PRN
Qty: 60 TABLET | Refills: 3 | Status: SHIPPED | OUTPATIENT
Start: 2020-02-07

## 2020-02-07 RX ORDER — DICLOFENAC SODIUM 10 MG/G
2 GEL TOPICAL 4 TIMES DAILY PRN
Qty: 5 TUBE | Refills: 3 | Status: SHIPPED | OUTPATIENT
Start: 2020-02-07 | End: 2020-02-07 | Stop reason: SDUPTHER

## 2020-02-07 RX ORDER — GABAPENTIN 600 MG/1
TABLET ORAL
Qty: 120 TABLET | Refills: 3 | Status: SHIPPED | OUTPATIENT
Start: 2020-02-07

## 2020-02-07 RX ORDER — DICLOFENAC SODIUM 10 MG/G
2 GEL TOPICAL 4 TIMES DAILY PRN
Qty: 5 TUBE | Refills: 3 | Status: SHIPPED | OUTPATIENT
Start: 2020-02-07 | End: 2020-03-08

## 2020-02-07 RX ORDER — GABAPENTIN 600 MG/1
TABLET ORAL
Qty: 120 TABLET | Refills: 3 | Status: SHIPPED | OUTPATIENT
Start: 2020-02-07 | End: 2020-02-07 | Stop reason: SDUPTHER

## 2020-02-07 NOTE — TELEPHONE ENCOUNTER
----- Message from Salbador Reynoso sent at 2/7/2020  3:41 PM CST -----  Contact: [facility] -Jnwrrsw-[phone] option 1  .Type:  Pharmacy Calling to Clarify an RX    Name of Caller:Tyler   Pharmacy Name:[facility]   Prescription Name:diclofenac sodium (VOLTAREN) 1 % Gel  What do they need to clarify? Direction / which areas/ change to 90 supply (mail order )  Best Call Back Number:[phone] option 1   Additional Information:     [facility] [place] - [address] [address]  Phone: [phone] Fax: [phone]

## 2020-03-27 ENCOUNTER — PATIENT MESSAGE (OUTPATIENT)
Dept: PAIN MEDICINE | Facility: CLINIC | Age: 45
End: 2020-03-27

## 2020-04-14 ENCOUNTER — PATIENT MESSAGE (OUTPATIENT)
Dept: SPORTS MEDICINE | Facility: CLINIC | Age: 45
End: 2020-04-14

## 2020-04-14 ENCOUNTER — TELEPHONE (OUTPATIENT)
Dept: SPORTS MEDICINE | Facility: CLINIC | Age: 45
End: 2020-04-14

## 2020-04-14 NOTE — TELEPHONE ENCOUNTER
Unable to reach patient.  Both numbers did not have voicemail set up.  Sent [employer] message to patient to see what he needs in his updated work letter.

## 2020-04-14 NOTE — TELEPHONE ENCOUNTER
----- Message from Kate Anglin sent at 4/14/2020  3:39 PM CDT -----  Contact: self  Pt need a letter for work. Asking for a call back.          Contact info [phone]

## 2020-05-01 ENCOUNTER — TELEPHONE (OUTPATIENT)
Dept: SPORTS MEDICINE | Facility: CLINIC | Age: 45
End: 2020-05-01

## 2020-05-06 ENCOUNTER — PATIENT MESSAGE (OUTPATIENT)
Dept: SPORTS MEDICINE | Facility: CLINIC | Age: 45
End: 2020-05-06

## 2020-05-12 ENCOUNTER — PATIENT MESSAGE (OUTPATIENT)
Dept: SPORTS MEDICINE | Facility: CLINIC | Age: 45
End: 2020-05-12

## 2020-05-12 ENCOUNTER — OFFICE VISIT (OUTPATIENT)
Dept: SPORTS MEDICINE | Facility: CLINIC | Age: 45
End: 2020-05-12
Payer: COMMERCIAL

## 2020-05-12 DIAGNOSIS — Z98.890 S/P SHOULDER SURGERY: Primary | ICD-10-CM

## 2020-05-12 PROCEDURE — 99212 PR OFFICE/OUTPT VISIT, EST, LEVL II, 10-19 MIN: ICD-10-PCS | Mod: 95,,, | Performed by: ORTHOPAEDIC SURGERY

## 2020-05-12 PROCEDURE — 99212 OFFICE O/P EST SF 10 MIN: CPT | Mod: 95,,, | Performed by: ORTHOPAEDIC SURGERY

## 2020-05-12 NOTE — PROGRESS NOTES
Telemedicine/Virtual Visit Documentation:     The patient location is: home    The chief complaint leading to consultation is: see HPI    VISIT TYPE X   Virtual visit with synchronous audio and video X   Telephone E/M service      Total time spent with patient: see X anita on chart below.   More than half of the time was spent counseling or coordinating care including prognosis, differential diagnosis, risks and benefits of treatment, instructions, compliance risk reductions     EST MINUTES X   43753 5    00217 10 X   99213 15    36192 25    94688 40    NEW     93348 10    40991 20    98050 30    20420 45    24257 60    PHONE      5-10    44034 11-20    07923 21-30      CC: Left shoulder post op 3 months    Juan Bennett returns for follow up evaluation of left shoulder.  He has finished physical therapy.  Occasion shoulder aching that wakes him up at night.  Otherwise doing well.    DATE OF SURGERY:  10/25/2019      PROCEDURE:   1. Left shoulder open subpectoral biceps tenodesis  2. Left shoulder arthroscopic subacromial decompression.   3. Left shoulder arthroscopic debridement labrum  4. Hardware removal left shoulder, deep     SURGEON: Shaji Galloway M.D.     Review of Systems   Constitution: Negative. Negative for chills, fever and night sweats.    Cardiovascular: Negative for chest pain and syncope.   Respiratory: Negative for cough and shortness of breath.   Gastrointestinal: Negative for abdominal pain and bowel incontinence.     PE:  There were no vitals filed for this visit.     Left shoulder  Incision healed  No sign of infection  Swelling resolved  Compartments soft  Neurovascular status intact in extremity    AROM  Forward elevation 165 degrees  External rotation 75 degrees  Internal rotation T10    Assessment:  Appropriate rotator cuff surgery recovery at 7 months    Plan:  1. Continue Celebrex  2. Work status: He is cleared to work without restrictions.  3. Follow up as needed.  Continue HEP.   Can return to clinic for steroid injection if shoulder pain flares up in the future.

## 2020-05-12 NOTE — LETTER
[place] [address]  [facility] [address]  Phone: [phone] May 12, 2020     Patient: Juan Bennett   YOB: 1975   Date of Visit: 5/12/2020       To Whom It May Concern:    Juan Bennett is a patient of mine.  He is cleared to return to work with full duty as of May 12, 2020.    If you have any questions or concerns, please don't hesitate to contact my office.    Sincerely,    Shaji Galloway MD

## 2020-10-10 NOTE — TELEPHONE ENCOUNTER
Received FMLA forms. Forms completed and faxed to [phone].    [facility]    
50.1

## 2020-12-01 ENCOUNTER — HISTORICAL (OUTPATIENT)
Dept: RADIOLOGY | Facility: HOSPITAL | Age: 45
End: 2020-12-01

## 2021-02-04 ENCOUNTER — HISTORICAL (OUTPATIENT)
Dept: ADMINISTRATIVE | Facility: HOSPITAL | Age: 46
End: 2021-02-04

## 2021-05-12 ENCOUNTER — PATIENT MESSAGE (OUTPATIENT)
Dept: RESEARCH | Facility: HOSPITAL | Age: 46
End: 2021-05-12

## 2022-04-11 ENCOUNTER — HISTORICAL (OUTPATIENT)
Dept: ADMINISTRATIVE | Facility: HOSPITAL | Age: 47
End: 2022-04-11
Payer: COMMERCIAL

## 2022-04-27 VITALS
HEIGHT: 66 IN | BODY MASS INDEX: 47.09 KG/M2 | WEIGHT: 293 LBS | DIASTOLIC BLOOD PRESSURE: 73 MMHG | SYSTOLIC BLOOD PRESSURE: 122 MMHG

## 2022-05-05 NOTE — HISTORICAL [facility]
This is a historical note converted from Doug. Formatting and pictures may have been removed.  Please reference Doug for original formatting and attached multimedia. Chief Complaint  Pt is here for right arm pain. PT stated he had surgery on his right elbow and right wrist. PT stated the pain starts in his right shoulder and goes down. Swelling noted in his arm.  History of Present Illness  Patient comes in today for his first visit. ?He is right and left-handed. ?Patient complains of worsening right shoulder pain and loss of motion since October.? He is currently taking prednisone without much relief. ?He is still not able to raise his shoulder. ?He has had a previous rotator cuff on the left side and states it feels very similar.? Patient states occasionally will shoot down in his elbow.? He denies any neck pain he denies other complaints.  Physical Exam  Vitals & Measurements  T:?36.5? ?C (Oral)? HR:?72(Peripheral)? BP:?122/73?  HT:?168.00?cm? WT:?132.900?kg? BMI:?47.09?  Patient is well-nourished developed male he is awake alert and oriented x3 he is in apparent stress he is pleasant and cooperative. ?Examination the right upper extremity compartment soft and warm. ?Skin is intact with no signs symptoms of DVT or infection. ?He is point tender along the anterior and anterolateral aspect the right shoulder he has severe pain over the biceps tendon?going into the elbow itself. ?He does have pain resisted forward flexion. ?Positive Negrete positive can sign positive drop arm. ?He is able to forward flex and abduct 20 degrees with pain and discomfort patient does have passive range of motion to 150. ?He is neurovascular tact distally.? Examination of the right elbow he has some tenderness anteriorly he has appropriate pronation supination flexion-extension?he is stable to stressing is neurovascular tact distally. ?X-rays 3 views right shoulder demonstrate some underlying impingement 2 views of the right elbow  demonstrate no obvious fracture dislocation.  Assessment/Plan  1.?Right rotator cuff tear?M75.101  ?At this time we discussed his physical exam and x-ray findings. ?Patient is mainly symptomatic about his right shoulder. ?He has a drop arm, we will proceed with an MRI of his right shoulder. ?I would like to see him back later this week for his results.  Ordered:  Clinic Follow up, *Est. 02/11/21 3:00:00 CST, Order for future visit, Right rotator cuff tear  Bicipital tendinitis  Sprain of right elbow, [facility]  MRI Ext Upper Joint Right W/O Contrast, Routine, 02/05/21 11:30:00 CST, Other (please specify), Shoulder trauma, rotator cuff tear suspected, neg xray, None, Ambulatory, Rad Type, Order for future visit, Right rotator cuff tear  Bicipital tendinitis  Sprain of right elbow, Schedule this t...  Office/Outpatient Visit Level 3 New [id number] PC, Right rotator cuff tear  Bicipital tendinitis  [facility], [facility], 02/04/21 10:13:00 CST  ?  2.?Bicipital tendinitis?M75.20  Ordered:  Clinic Follow up, *Est. 02/11/21 3:00:00 CST, Order for future visit, Right rotator cuff tear  Bicipital tendinitis  Sprain of right elbow, [facility]  MRI Ext Upper Joint Right W/O Contrast, Routine, 02/05/21 11:30:00 CST, Other (please specify), Shoulder trauma, rotator cuff tear suspected, neg xray, None, Ambulatory, Rad Type, Order for future visit, Right rotator cuff tear  Bicipital tendinitis  Sprain of right elbow, Schedule this t...  Office/Outpatient Visit Level 3 New [id number] PC, Right rotator cuff tear  Bicipital tendinitis  Sprain of right elbow, [facility], 02/04/21 10:13:00 CST  ?  3.?Sprain of right elbow?S53.401A  Ordered:  Clinic Follow up, *Est. 02/11/21 3:00:00 CST, Order for future visit, Right rotator cuff tear  Bicipital tendinitis  Sprain of right elbow, [facility]  MRI Ext Upper Joint Right W/O Contrast, Routine, 02/05/21 11:30:00 CST, Other (please specify),  Shoulder trauma, rotator cuff tear suspected, neg xray, None, Ambulatory, Rad Type, Order for future visit, Right rotator cuff tear  Bicipital tendinitis  Sprain of right elbow, Schedule this t...  Office/Outpatient Visit Level 3 New [id number] PC, Right rotator cuff tear  Bicipital tendinitis  Sprain of right elbow, [facility], 02/04/21 10:13:00 CST  ?  Orders:  XR Elbow Right Minimum 3 Views, Routine, 02/04/21 9:48:00 CST, None, Ambulatory, Rad Type, Right elbow pain  Right shoulder pain, Not Scheduled, 02/04/21 9:48:00 CST  XR Shoulder Right Minimum 2 Views, Routine, 02/04/21 9:49:00 CST, None, Ambulatory, Rad Type, Right shoulder pain, Not Scheduled, 02/04/21 9:49:00 CST  Referrals  Clinic Follow up, *Est. 02/11/21 3:00:00 CST, Order for future visit, Right rotator cuff tear  Bicipital tendinitis  [facility]   Problem List/Past Medical History  Ongoing  Obesity  Tobacco user  Historical  No qualifying data  Procedure/Surgical History  Appendectomy (01/01/2004)  Cholecystectomy (01/01/2004)  biceps tenotomy  left shoulder rotator cuff repair  right elbow surgery  right wrist surgery   Medications  acetaminophen-codeine 300 mg-60 mg oral tablet, 1 tab(s), Oral, q6hr  AMLODIPINE BESYLATE 10MG TABLETS, 10 mg= 1 tab(s), Oral, Daily  aspirin 81 mg oral Delayed Release (EC) tablet, 81 mg= 1 tab(s), Oral, Daily, 6 refills  carvedilol 25 mg oral tablet, 25 mg= 1 tab(s), Oral, BID  CEPHALEXIN 500MG CAPSULES, 500 mg= 1 cap(s), Oral, QID  CLINDAMYCIN 150MG CAPSULES, 300 mg= 2 cap(s), Oral, QID  CLONIDINE 0.1MG TABLETS  DICLOFENAC POTASSIUM 50MG TABLETS, 50 mg= 1 tab(s), Oral, BID  FAMOTIDINE 20MG TABLETS  FLUCONAZOLE 50MG TABLETS, 50 mg= 1 tab(s), Oral, Daily  hydrALAZINE 50 mg oral tablet, See Instructions, 5 refills  HYDROCHLOROTHIAZIDE 25MG TAB, Oral, Daily,? ?Not Taking, Completed Rx  hydrochlorothiazide 50 mg oral tablet, 50 mg= 1 tab(s), Oral, Daily, 6 refills  IBUPROFEN 800MG  TABLETS, 800 mg= 1 tab(s), Oral, TID  LISINOPRIL 20MG TABLETS, 20 mg= 1 tab(s), Oral, Daily,? ?Not Taking, Completed Rx  lisinopril 40 mg oral tablet, 40 mg= 1 tab(s), Oral, Daily, 6 refills  METHOCARBAMOL 500MG TABLETS, 500 mg= 1 tab(s), Oral, TID  METRONIDAZOLE 500MG TABLETS, 500 mg= 1 tab(s), Oral, q8hr,? ?Not Taking, Completed Rx  Mobic 15 mg oral tablet, 15 mg= 1 tab(s), Oral, Daily  prednisONE 10 mg oral tablet, 10 mg= 1 tab(s), Oral, Daily  rosuvastatin 20 mg oral tablet, See Instructions, 5 refills  TRAMADOL 50MG TABLETS, 50 mg= 1 tab(s), Oral, q8hr  traZODone 100 mg oral tablet, Oral, BID  Zestril 20 Mg Tablet, 20 mg= 1 tab(s), Oral, Daily,? ?Not Taking, Completed Rx  Allergies  acetaminophen-oxycodone?(Narrative comment section, Nausea and vomiting, Itching, Urticaria, Hallucinations, Anxiety)  Xopenex?(HIVES)  albuterol?(hives)  levalbuterol?(Urticaria)  morphine?(hives)  oxyCODONE?(Hives, Psychosis, Hallucinations, Agitation)  penicillin?(hives)  Social History  Abuse/Neglect  No, 02/04/2021  Alcohol  Past, Beer, 06/29/2018  Employment/School  Employed, Highest education level: High school., 07/19/2018  Exercise  Exercise type: Walking., 07/19/2018  Home/Environment  Lives with Spouse. Living situation: Home/Independent. Alcohol abuse in household: No. Substance abuse in household: No. Smoker in household: No. Feels unsafe at home: No. Safe place to go: Yes. Family/Friends available for support: Yes., 07/19/2018  Nutrition/Health  Regular, Caffeine intake amount: coffee daily, occassional coke., 07/19/2018  Substance Use  Never, 06/29/2018  Tobacco  4 or less cigarettes(less than 1/4 pack)/day in last 30 days, Cigarettes, Yes, 02/04/2021  Family History  Cardiac arrest.: Father.  Diabetes mellitus type 2: Mother and Father.  Hypertension.: Father and Sister.  Open heart surgery: Mother.  Primary malignant neoplasm of bladder: Father.  Primary malignant neoplasm of lung: Mother.  Immunizations  Vaccine  Date Status   tetanus/diphtheria/pertussis, acel(Tdap) 07/19/2018 Given   Health Maintenance  Health Maintenance  ???Pending?(in the next year)  ??? ??OverDue  ??? ? ? ?Aspirin Therapy for CVD Prevention due??07/19/19??and every 1??year(s)  ??? ? ? ?Influenza Vaccine due??10/01/20??and every 1??day(s)  ??? ? ? ?Smoking Cessation due??01/01/21??and every 1??year(s)  ??? ??Due?  ??? ? ? ?Alcohol Misuse Screening due??01/02/21??and every 1??year(s)  ??? ? ? ?ADL Screening due??02/04/21??and every 1??year(s)  ??? ??Due In Future?  ??? ? ? ?Diabetes Screening not due until??07/18/21??and every 3??year(s)  ??? ? ? ?Obesity Screening not due until??01/01/22??and every 1??year(s)  ???Satisfied?(in the past 1 year)  ??? ??Satisfied?  ??? ? ? ?Blood Pressure Screening on??02/04/21.??Satisfied by Radha Torrez LPN  ??? ? ? ?Body Mass Index Check on??02/04/21.??Satisfied by Radha Torrez LPN  ??? ? ? ?Depression Screening on??02/04/21.??Satisfied by Radha Torrez LPN  ??? ? ? ?Hypertension Management-Blood Pressure on??02/04/21.??Satisfied by Radha Torrez LPN  ??? ? ? ?Obesity Screening on??02/04/21.??Satisfied by Radha Torrez LPN  ?

## (undated) DEVICE — UNDERGLOVE BIOGEL PI SZ 6.5 LF

## (undated) DEVICE — SEE MEDLINE ITEM 152530

## (undated) DEVICE — SEE MEDLINE ITEM 157160

## (undated) DEVICE — Device

## (undated) DEVICE — ELECTRODE REM PLYHSV RETURN 9

## (undated) DEVICE — UNDERGLOVES BIOGEL PI SIZE 8

## (undated) DEVICE — APPLICATOR CHLORAPREP ORN 26ML

## (undated) DEVICE — BURR OVAL CUTTING 6 MM

## (undated) DEVICE — SEE MEDLINE ITEM 146313

## (undated) DEVICE — KIT TRIMANO

## (undated) DEVICE — TUBE SET INFLOW/OUTFLOW

## (undated) DEVICE — DRAPE PLASTIC U 60X72

## (undated) DEVICE — GAUZE SPONGE 4X4 12PLY

## (undated) DEVICE — SOL IRR NACL .9% 3000ML

## (undated) DEVICE — SUPPORT SLING SHOT II MEDIUM

## (undated) DEVICE — ADHESIVE MASTISOL VIAL 48/BX

## (undated) DEVICE — DRESSING XEROFORM FOIL PK 1X8

## (undated) DEVICE — PAD ABD 8X10 STERILE

## (undated) DEVICE — SKIN MARKER DEVON 160

## (undated) DEVICE — KIT SHOULDER POSITIONER SPIDER

## (undated) DEVICE — NDL SUTURE FLEXIBLE

## (undated) DEVICE — SEE MEDLINE ITEM 157216

## (undated) DEVICE — SUT VICRYL PLUS 3-0 SH 18IN

## (undated) DEVICE — LANZA BLADE 4.2MM X 13CM

## (undated) DEVICE — GRASPER SUTURE 60 DEG 15CM PNK

## (undated) DEVICE — PAD ELECTRODE STER 1.5X3

## (undated) DEVICE — PAD SHOULDER CARE POLAR

## (undated) DEVICE — CANNULA DRY DOC 7 X 85

## (undated) DEVICE — DRAPE SURG W/TWL 17 5/8X23

## (undated) DEVICE — SYS PRINEO SKIN CLOSURE

## (undated) DEVICE — KIT TRIMANO CHIN

## (undated) DEVICE — SOL 9P NACL IRR PIC IL

## (undated) DEVICE — DRAPE STERI U-SHAPED 47X51IN

## (undated) DEVICE — BLADE SURG STAINLESS STEEL #15

## (undated) DEVICE — SEE MEDLINE ITEM 157166

## (undated) DEVICE — ELECTRODE 90 DEGREE ANGLE

## (undated) DEVICE — CLOSURE SKIN STERI STRIP 1/2X4

## (undated) DEVICE — SEE MEDLINE ITEM 157150

## (undated) DEVICE — GLOVE BIOGEL SKINSENSE PI 8.0

## (undated) DEVICE — SEE MEDLINE ITEM 152529